# Patient Record
Sex: MALE | Race: WHITE | Employment: OTHER
[De-identification: names, ages, dates, MRNs, and addresses within clinical notes are randomized per-mention and may not be internally consistent; named-entity substitution may affect disease eponyms.]

---

## 2022-06-28 SDOH — HEALTH STABILITY: PHYSICAL HEALTH: ON AVERAGE, HOW MANY MINUTES DO YOU ENGAGE IN EXERCISE AT THIS LEVEL?: 30 MIN

## 2022-06-28 SDOH — HEALTH STABILITY: PHYSICAL HEALTH: ON AVERAGE, HOW MANY DAYS PER WEEK DO YOU ENGAGE IN MODERATE TO STRENUOUS EXERCISE (LIKE A BRISK WALK)?: 4 DAYS

## 2022-06-29 ENCOUNTER — OFFICE VISIT (OUTPATIENT)
Dept: INTERNAL MEDICINE CLINIC | Facility: CLINIC | Age: 72
End: 2022-06-29
Payer: MEDICARE

## 2022-06-29 VITALS
SYSTOLIC BLOOD PRESSURE: 130 MMHG | WEIGHT: 222 LBS | DIASTOLIC BLOOD PRESSURE: 82 MMHG | OXYGEN SATURATION: 97 % | BODY MASS INDEX: 29.42 KG/M2 | HEIGHT: 73 IN | HEART RATE: 56 BPM | TEMPERATURE: 98.4 F

## 2022-06-29 DIAGNOSIS — E78.2 MIXED HYPERLIPIDEMIA: ICD-10-CM

## 2022-06-29 DIAGNOSIS — Z76.89 ESTABLISHING CARE WITH NEW DOCTOR, ENCOUNTER FOR: Primary | ICD-10-CM

## 2022-06-29 DIAGNOSIS — I10 ESSENTIAL (PRIMARY) HYPERTENSION: ICD-10-CM

## 2022-06-29 DIAGNOSIS — E03.9 ACQUIRED HYPOTHYROIDISM: ICD-10-CM

## 2022-06-29 DIAGNOSIS — D69.6 DISORDER INVOLVING THROMBOCYTOPENIA (HCC): ICD-10-CM

## 2022-06-29 PROBLEM — E55.9 VITAMIN D DEFICIENCY: Status: ACTIVE | Noted: 2018-06-04

## 2022-06-29 PROBLEM — J31.0 CHRONIC RHINITIS: Status: ACTIVE | Noted: 2022-06-29

## 2022-06-29 PROBLEM — I65.23 BILATERAL CAROTID ARTERY STENOSIS: Status: ACTIVE | Noted: 2020-06-01

## 2022-06-29 PROBLEM — H91.93 BILATERAL HEARING LOSS: Status: ACTIVE | Noted: 2020-06-01

## 2022-06-29 PROBLEM — J30.9 ALLERGIC RHINITIS: Status: ACTIVE | Noted: 2022-06-29

## 2022-06-29 PROBLEM — D80.9 IMMUNODEFICIENCY WITH PREDOMINANTLY ANTIBODY DEFECTS, UNSPECIFIED (HCC): Status: ACTIVE | Noted: 2021-11-01

## 2022-06-29 PROCEDURE — 1036F TOBACCO NON-USER: CPT | Performed by: INTERNAL MEDICINE

## 2022-06-29 PROCEDURE — 99203 OFFICE O/P NEW LOW 30 MIN: CPT | Performed by: INTERNAL MEDICINE

## 2022-06-29 PROCEDURE — G8427 DOCREV CUR MEDS BY ELIG CLIN: HCPCS | Performed by: INTERNAL MEDICINE

## 2022-06-29 PROCEDURE — 3017F COLORECTAL CA SCREEN DOC REV: CPT | Performed by: INTERNAL MEDICINE

## 2022-06-29 PROCEDURE — G8417 CALC BMI ABV UP PARAM F/U: HCPCS | Performed by: INTERNAL MEDICINE

## 2022-06-29 PROCEDURE — 1123F ACP DISCUSS/DSCN MKR DOCD: CPT | Performed by: INTERNAL MEDICINE

## 2022-06-29 RX ORDER — GABAPENTIN 300 MG/1
300 CAPSULE ORAL
COMMUNITY
Start: 2021-11-22

## 2022-06-29 RX ORDER — AZELASTINE HYDROCHLORIDE 137 UG/1
SPRAY, METERED NASAL
COMMUNITY
Start: 2022-05-20

## 2022-06-29 RX ORDER — DOXYCYCLINE HYCLATE 100 MG/1
CAPSULE ORAL
COMMUNITY
Start: 2022-03-30

## 2022-06-29 RX ORDER — ATORVASTATIN CALCIUM 20 MG/1
20 TABLET, FILM COATED ORAL
COMMUNITY
Start: 2021-11-22

## 2022-06-29 RX ORDER — KETOCONAZOLE 20 MG/ML
SHAMPOO TOPICAL
COMMUNITY
Start: 2022-06-16

## 2022-06-29 RX ORDER — VITAMIN B COMPLEX
300 TABLET ORAL
COMMUNITY
Start: 2020-01-01

## 2022-06-29 RX ORDER — MONTELUKAST SODIUM 10 MG/1
TABLET ORAL
COMMUNITY
Start: 2022-06-21

## 2022-06-29 ASSESSMENT — ENCOUNTER SYMPTOMS
CHEST TIGHTNESS: 0
SHORTNESS OF BREATH: 0

## 2022-06-29 NOTE — PROGRESS NOTES
ASSESSMENT/PLAN:    Care established. Evaluation and management of the chronic condition(s) delineated. No negative side effects reported. I have reviewed all the lab results. There are some abnormalities that are either expected or not critical to the patient's health, and are discussed in the office today and are addressed. Please refer to the above assessement and plan narrative and orders and follow up plan. Medication discussed and refilled as needed. Physical exam findings are stable unless otherwise indicated and this is addressed. The most recent lab work and imaging and consultant/urgent care visits and imaging are reviewed and discussed and considered during this visit encounter. 1. Establishing care with new doctor, encounter for       2. Disorder involving thrombocytopenia (HCC)  Monitor Plts. Stable on recent labs. [place] labs reviewed. Prior eval with Hematology. - Comprehensive Metabolic Panel; Future  - CBC; Future    3. Acquired hypothyroidism  Treatment regimen is effective. - TSH; Future    4. Essential (primary) hypertension  Treatment regimen is effective. Echo normal.      - Comprehensive Metabolic Panel; Future  - CBC; Future  - Lipid Panel; Future  - TSH; Future    5. Mixed hyperlipidemia  The patient is asked to make an attempt to improve diet and exercise patterns to aid in medical management of this problem. A healthy diet to consider is a more mediterranean diet which is abundant in fruits, vegetables, whole grains, legumes and olive oil. It features fish and poultry, lean sources of protein over red meat. The importance of a healthy lifestyle are discussed. Limit high fructose containing foods, moderate portion sizes,  regular cardiovascular exercise (walking, swimming, aerobics) for 30-45 minutes, 3-4 times weekly. - Lipid Panel;  Future                On this date, 6/29/22, I have spent 30 minutes reviewing previous notes, test results and face to face with the patient discussing the diagnosis and importance of compliance with the treatment plan as well as documenting on the day of the visit. An electronic signature was used to authenticate this note. -- Cathi Souza MD     Return in about 6 months (around 2022). SUBJECTIVE/OBJECTIVE:  Chief Complaint   Patient presents with    New Patient     previous PCP Dr Andrzej Coon [facility] in [facility]       HPI:   Mino Robbins (: 1950 is a 70 y.o. male, here for evaluation of the following chief complaint(s):   Chief Complaint   Patient presents with    New Patient     previous PCP Dr Andrzej Coon Internal [facility] in [facility]     The patient. 59-year-old male. Previously followed at [facility] internal medicine by Dr. Jadine Osler. Most recently he followed up with internal medicine May 19 and notes reviewed. Blood pressure fluctuations some leg swelling. It appears an echocardiogram was recommended. Pulmonology about it but they didn't have any suggestions. Doesn't note any changes in routine except maybe going to bed a bit earlier. Finally, asking about ED medication; has been on viagra and can get erection, however cannot reach orgasm and ejaculate. Asking if trying different ED medication might be worth a try. Does acknowledge that he sometimes has psychological barriers as well as medications that can be barrier, but hoping to overcome this       Patient is here for follow-up and management of chronic medical conditions, review of recent labs, review of any imaging completed since our last office visit and discuss any consultants opinions or management changes. Echo 2022- EF 55-65%    Normal left ventricular diastolic function. · The right ventricular systolic function is normal.  · Mild aortic valve calcification without stenosis. · No significant valvular abnormalities.     MINNIE  CPAP Compliance:  2022 - 2022  Usage days greater than 4 hours 100%  Average usage 7 hours 27 minutes  AutoSet 5 to 17 cm H2O  95th percentile pressure 16.9 cm H2O  95th percentile leak 3.5 L/min  AHI 1.6 events per hour    Colonoscopy 2022. Follow up in 3 yr with Dr. Filiberto Canales, personal history of a sessile serrated adenoma on his last colonoscopy of 2/18/2019. In addition he has a history of colon cancer in his mother. Hx of low PLT. No bleeding. ITP? Stable. Was previously evaluated by Dr. Lauren Kong. No f/c/s wt loss. Labs reviewed and discussed and medication refilled as needed for chronic medications during ov or adjusted based on lab results and/or our discussion as appropriate. See discussion. The patient's available records and electronic chart records are reviewed. The PMH, PSH, medications, allergies, medications, FH, health maintenance and vaccination status are all reviewed and updated as appropriate. Records from outside providers have been reviewed, summarized, and considered as noted in the history of present illness, past medical history, and objective data of this note and encounter.           Health Maintenance   Topic Date Due    Annual Wellness Visit (AWV)  Never done    Lipids  Never done    Depression Screen  Never done    DTaP/Tdap/Td vaccine (2 - Td or Tdap) 07/23/2022    Colorectal Cancer Screen  02/21/2032    Flu vaccine  Completed    Pneumococcal 65+ years Vaccine  Completed    COVID-19 Vaccine  Completed    Hepatitis A vaccine  Aged Out    Hepatitis B vaccine  Aged Out    Hib vaccine  Aged Out    Meningococcal (ACWY) vaccine  Aged Out    Hepatitis C screen  Discontinued     Patient Active Problem List   Diagnosis    Disorder involving thrombocytopenia ([facility] [address]    Splenomegaly    Acquired hypothyroidism    Bilateral carotid artery stenosis    Bilateral hearing loss    Essential (primary) hypertension    Immunodeficiency with predominantly antibody defects, unspecified ([address]    Mixed hyperlipidemia    Obstructive sleep apnea    Chronic rhinitis    Neuropathy    Vitamin D deficiency       Review of Systems   Constitutional: Negative for activity change and fatigue. Eyes: Negative for visual disturbance. Respiratory: Negative for chest tightness and shortness of breath. Cardiovascular: Negative for chest pain, palpitations and leg swelling. Endocrine: Negative for polydipsia and polyuria. Genitourinary: Negative for dysuria. Musculoskeletal: Negative for myalgias. Skin: Negative for wound. Neurological: Negative for headaches. Psychiatric/Behavioral: Negative for dysphoric mood. No results found for: WBC, HGB, HCT, MCV, RDW, PLT, NEUTOPHILPCT, LYMPHOPCT, MONOPCT, EOSRELPCT, BASOPCT, LYMPHSABS, MONOSABS, EOSABS, BASOSABS, IMMGRAN, GRANULOCYTEABSOLUTECOUNT    No results found for: NA, K, CL, CO2, ANIONGAP, GLUCOSE, BUN, CREATININE, GFRAA, LABGLOM, CALCIUM, BILITOT, ALT, AST, ALKPHOS, PROT, LABALBU, GLOB, ALBUMIN    No results found for: CHOL, HDL, LDL, TRIG, LDLCALC, VLDL    No results found for: LABA1C    No results found for: TSH    No results found for: PSA    No results found for: SPECGRAV, PHUR, COLORU, CLARITYU, LEUKOCYTESUR, PROTEINU, GLUCOSEU, KETUA, BLOODU, BILIRUBINUR, UROBILINOGEN, NITRU, LABMICR        Vitals:    06/29/22 0844   BP: 130/82   Site: Left Upper Arm   Position: Sitting   Cuff Size: Small Adult   Pulse: 56   Temp: 98.4 °F (36.9 °C)   TempSrc: Temporal   SpO2: 97%   Weight: 222 lb (100.7 kg)   Height: 6' 0.5\" (1.842 m)     Wt Readings from Last 3 Encounters:   06/29/22 222 lb (100.7 kg)     BP Readings from Last 3 Encounters:   06/29/22 130/82     Physical Exam  Vitals and nursing note reviewed. Exam conducted with a chaperone present. Constitutional:       General: He is not in acute distress. Appearance: Normal appearance. HENT:      Head: Normocephalic and atraumatic. Eyes:      Extraocular Movements: Extraocular movements intact. Conjunctiva/sclera: Conjunctivae normal.   Neck:      Vascular: No carotid bruit. Cardiovascular:      Rate and Rhythm: Normal rate and regular rhythm. Heart sounds: No murmur heard. No friction rub. No gallop. Pulmonary:      Effort: Pulmonary effort is normal.      Breath sounds: Normal breath sounds. Musculoskeletal:      Right lower leg: No edema. Left lower leg: No edema. Skin:     Coloration: Skin is not jaundiced or pale. Neurological:      General: No focal deficit present. Mental Status: He is alert. Mental status is at baseline.    Psychiatric:         Mood and Affect: Mood normal.         Behavior: Behavior normal.

## 2022-12-06 ENCOUNTER — PATIENT MESSAGE (OUTPATIENT)
Dept: INTERNAL MEDICINE CLINIC | Facility: CLINIC | Age: 72
End: 2022-12-06

## 2022-12-06 DIAGNOSIS — I10 ESSENTIAL HYPERTENSION: Primary | ICD-10-CM

## 2022-12-06 NOTE — TELEPHONE ENCOUNTER
From: Reagan Robbins  To: Dr. Jennifer Ladd: 12/6/2022 2:20 PM EST  Subject: Refill for Lisinopril    I will run out of lisinopril in the next week. I am not scheduled to see the  until after [place], labs prior to [place]. Can I get a new prescription or at least 30 days worth to get me through my  visit at the end of the month? Pharmacy is [facility] [place] . Phone # [phone].     Faheem Talamantes

## 2022-12-07 RX ORDER — LISINOPRIL 20 MG/1
20 TABLET ORAL DAILY
Qty: 90 TABLET | Refills: 3 | Status: SHIPPED | OUTPATIENT
Start: 2022-12-07

## 2022-12-22 DIAGNOSIS — D69.6 DISORDER INVOLVING THROMBOCYTOPENIA (HCC): ICD-10-CM

## 2022-12-22 DIAGNOSIS — E03.9 ACQUIRED HYPOTHYROIDISM: ICD-10-CM

## 2022-12-22 DIAGNOSIS — E78.2 MIXED HYPERLIPIDEMIA: ICD-10-CM

## 2022-12-22 DIAGNOSIS — I10 ESSENTIAL (PRIMARY) HYPERTENSION: ICD-10-CM

## 2022-12-22 LAB
ALBUMIN SERPL-MCNC: 4.2 G/DL (ref 3.2–4.6)
ALBUMIN/GLOB SERPL: 1.8 (ref 0.4–1.6)
ALP SERPL-CCNC: 98 U/L (ref 50–136)
ALT SERPL-CCNC: 38 U/L (ref 12–65)
ANION GAP SERPL CALC-SCNC: 4 MMOL/L (ref 2–11)
AST SERPL-CCNC: 27 U/L (ref 15–37)
BILIRUB SERPL-MCNC: 0.5 MG/DL (ref 0.2–1.1)
BUN SERPL-MCNC: 20 MG/DL (ref 8–23)
CALCIUM SERPL-MCNC: 8.7 MG/DL (ref 8.3–10.4)
CHLORIDE SERPL-SCNC: 108 MMOL/L (ref 101–110)
CHOLEST SERPL-MCNC: 120 MG/DL
CO2 SERPL-SCNC: 28 MMOL/L (ref 21–32)
CREAT SERPL-MCNC: 1.1 MG/DL (ref 0.8–1.5)
ERYTHROCYTE [DISTWIDTH] IN BLOOD BY AUTOMATED COUNT: 13.1 % (ref 11.9–14.6)
GLOBULIN SER CALC-MCNC: 2.4 G/DL (ref 2.8–4.5)
GLUCOSE SERPL-MCNC: 109 MG/DL (ref 65–100)
HCT VFR BLD AUTO: 43 % (ref 41.1–50.3)
HDLC SERPL-MCNC: 32 MG/DL (ref 40–60)
HDLC SERPL: 3.8
HGB BLD-MCNC: 14.8 G/DL (ref 13.6–17.2)
LDLC SERPL CALC-MCNC: 55.4 MG/DL
MCH RBC QN AUTO: 29.8 PG (ref 26.1–32.9)
MCHC RBC AUTO-ENTMCNC: 34.4 G/DL (ref 31.4–35)
MCV RBC AUTO: 86.5 FL (ref 82–102)
NRBC # BLD: 0 K/UL (ref 0–0.2)
PLATELET # BLD AUTO: 137 K/UL (ref 150–450)
PMV BLD AUTO: 10.2 FL (ref 9.4–12.3)
POTASSIUM SERPL-SCNC: 4.2 MMOL/L (ref 3.5–5.1)
PROT SERPL-MCNC: 6.6 G/DL (ref 6.3–8.2)
RBC # BLD AUTO: 4.97 M/UL (ref 4.23–5.6)
SODIUM SERPL-SCNC: 140 MMOL/L (ref 133–143)
TRIGL SERPL-MCNC: 163 MG/DL (ref 35–150)
TSH, 3RD GENERATION: 1.66 UIU/ML (ref 0.36–3.74)
VLDLC SERPL CALC-MCNC: 32.6 MG/DL (ref 6–23)
WBC # BLD AUTO: 4.9 K/UL (ref 4.3–11.1)

## 2022-12-29 ENCOUNTER — OFFICE VISIT (OUTPATIENT)
Dept: INTERNAL MEDICINE CLINIC | Facility: CLINIC | Age: 72
End: 2022-12-29
Payer: MEDICARE

## 2022-12-29 VITALS
OXYGEN SATURATION: 97 % | HEART RATE: 57 BPM | BODY MASS INDEX: 31.94 KG/M2 | HEIGHT: 73 IN | DIASTOLIC BLOOD PRESSURE: 74 MMHG | WEIGHT: 241 LBS | TEMPERATURE: 98.6 F | SYSTOLIC BLOOD PRESSURE: 126 MMHG

## 2022-12-29 DIAGNOSIS — D69.6 DISORDER INVOLVING THROMBOCYTOPENIA (HCC): ICD-10-CM

## 2022-12-29 DIAGNOSIS — D80.9 IMMUNODEFICIENCY WITH PREDOMINANTLY ANTIBODY DEFECTS, UNSPECIFIED (HCC): ICD-10-CM

## 2022-12-29 DIAGNOSIS — N52.9 ERECTILE DYSFUNCTION, UNSPECIFIED ERECTILE DYSFUNCTION TYPE: ICD-10-CM

## 2022-12-29 DIAGNOSIS — Z23 ENCOUNTER FOR IMMUNIZATION: ICD-10-CM

## 2022-12-29 DIAGNOSIS — E03.9 ACQUIRED HYPOTHYROIDISM: ICD-10-CM

## 2022-12-29 DIAGNOSIS — G62.9 NEUROPATHY: ICD-10-CM

## 2022-12-29 DIAGNOSIS — E78.2 MIXED HYPERLIPIDEMIA: Primary | ICD-10-CM

## 2022-12-29 LAB
CRP SERPL-MCNC: 0.3 MG/DL (ref 0–0.9)
ERYTHROCYTE [SEDIMENTATION RATE] IN BLOOD: <1 MM/HR
FERRITIN SERPL-MCNC: 39 NG/ML (ref 8–388)
VIT B12 SERPL-MCNC: 550 PG/ML (ref 193–986)

## 2022-12-29 PROCEDURE — G8427 DOCREV CUR MEDS BY ELIG CLIN: HCPCS | Performed by: INTERNAL MEDICINE

## 2022-12-29 PROCEDURE — 1036F TOBACCO NON-USER: CPT | Performed by: INTERNAL MEDICINE

## 2022-12-29 PROCEDURE — G8484 FLU IMMUNIZE NO ADMIN: HCPCS | Performed by: INTERNAL MEDICINE

## 2022-12-29 PROCEDURE — G0008 ADMIN INFLUENZA VIRUS VAC: HCPCS | Performed by: INTERNAL MEDICINE

## 2022-12-29 PROCEDURE — 3074F SYST BP LT 130 MM HG: CPT | Performed by: INTERNAL MEDICINE

## 2022-12-29 PROCEDURE — 1123F ACP DISCUSS/DSCN MKR DOCD: CPT | Performed by: INTERNAL MEDICINE

## 2022-12-29 PROCEDURE — 99214 OFFICE O/P EST MOD 30 MIN: CPT | Performed by: INTERNAL MEDICINE

## 2022-12-29 PROCEDURE — G8417 CALC BMI ABV UP PARAM F/U: HCPCS | Performed by: INTERNAL MEDICINE

## 2022-12-29 PROCEDURE — 3017F COLORECTAL CA SCREEN DOC REV: CPT | Performed by: INTERNAL MEDICINE

## 2022-12-29 PROCEDURE — 90694 VACC AIIV4 NO PRSRV 0.5ML IM: CPT | Performed by: INTERNAL MEDICINE

## 2022-12-29 PROCEDURE — 3078F DIAST BP <80 MM HG: CPT | Performed by: INTERNAL MEDICINE

## 2022-12-29 RX ORDER — TADALAFIL 10 MG/1
10 TABLET ORAL PRN
Qty: 30 TABLET | Refills: 3 | Status: SHIPPED | OUTPATIENT
Start: 2022-12-29

## 2022-12-29 RX ORDER — GABAPENTIN 300 MG/1
300 CAPSULE ORAL DAILY
Qty: 90 CAPSULE | Refills: 3 | Status: SHIPPED | OUTPATIENT
Start: 2022-12-29 | End: 2023-03-29

## 2022-12-29 RX ORDER — ATORVASTATIN CALCIUM 20 MG/1
20 TABLET, FILM COATED ORAL
Qty: 90 TABLET | Refills: 3 | Status: SHIPPED | OUTPATIENT
Start: 2022-12-29

## 2022-12-29 RX ORDER — LEVOTHYROXINE SODIUM 0.05 MG/1
50 TABLET ORAL
Qty: 90 TABLET | Refills: 3 | Status: SHIPPED | OUTPATIENT
Start: 2022-12-29

## 2022-12-29 ASSESSMENT — ENCOUNTER SYMPTOMS
CHEST TIGHTNESS: 0
SHORTNESS OF BREATH: 0

## 2022-12-29 NOTE — PROGRESS NOTES
ASSESSMENT/PLAN:    1. Mixed hyperlipidemia  Treatment regimen is effective. The patient is asked to make an attempt to improve diet and exercise patterns to aid in medical management of this problem. - atorvastatin (LIPITOR) 20 MG tablet; Take 1 tablet by mouth Twice a Week  Dispense: 90 tablet; Refill: 3    2. Encounter for immunization     - Influenza, FLUAD, (age 72 y+), IM, Preservative Free, 0.5 mL    3. Acquired hypothyroidism  Treatment regimen is effective. - levothyroxine (SYNTHROID) 50 MCG tablet; Take 1 tablet by mouth every morning (before breakfast)  Dispense: 90 tablet; Refill: 3    4. Neuropathy  NCV and additional labs. See orders. Etiology? Doubt CMT. - gabapentin (NEURONTIN) 300 MG capsule; Take 1 capsule by mouth daily for 90 days. Dispense: 90 capsule; Refill: 3  - Sedimentation Rate; Future  - C-Reactive Protein; Future  - RPR; Future  - MAAME and PE, Serum; Future  - Vitamin B12; Future  - Ferritin; Future  - [facility]    5. Immunodeficiency with predominantly antibody defects, unspecified ([address]     - gabapentin (NEURONTIN) 300 MG capsule; Take 1 capsule by mouth daily for 90 days. Dispense: 90 capsule; Refill: 3  - Sedimentation Rate; Future  - C-Reactive Protein; Future  - RPR; Future  - MAAME and PE, Serum; Future  - Vitamin B12; Future  - Ferritin; Future  - [facility]    6. Disorder involving thrombocytopenia (HCC)  CBC stable. - gabapentin (NEURONTIN) 300 MG capsule; Take 1 capsule by mouth daily for 90 days. Dispense: 90 capsule; Refill: 3  - Sedimentation Rate; Future  - C-Reactive Protein; Future  - RPR; Future  - MAAME and PE, Serum; Future  - Vitamin B12; Future  - Ferritin; Future  - [facility]    7. Erectile dysfunction, unspecified erectile dysfunction type  Cialis prn   - tadalafil (CIALIS) 10 MG tablet;  Take 1 tablet by mouth as needed for Erectile Dysfunction  Dispense: 30 tablet; Refill: 3      Evaluation and management of the chronic condition(s) delineated. No negative side effects reported. I have reviewed all the lab results. There are some abnormalities that are either expected or not critical to the patient's health, and are discussed in the office today and are addressed. Please refer to the above assessement and plan narrative and orders and follow up plan. Medication discussed and refilled as needed. Physical exam findings are stable unless otherwise indicated and this is addressed. The most recent lab work and imaging and consultant/urgent care visits and imaging are reviewed and discussed and considered during this visit encounter. On this date, 22, I have spent 35 minutes reviewing previous notes, test results and face to face with the patient discussing the diagnosis and importance of compliance with the treatment plan as well as documenting on the day of the visit. An electronic signature was used to authenticate this note. -- Td Arnold MD     Return in about 6 months (around 2023). SUBJECTIVE/OBJECTIVE:      HPI:   Celia Wagner (: 1950 is a 67 y.o. male, here for evaluation of the following chief complaint(s):   Chief Complaint   Patient presents with    Hypothyroidism     6 month recheck    Hypertension    Cholesterol Problem    Discuss Labs     The patient. 67 y.o.-old male. Previously followed at [facility] internal medicine by Dr. Chrissy Portillo. Most recently he followed up with internal medicine May 19 and notes reviewed. Patient is here for follow-up and management of chronic medical conditions, review of recent labs, review of any imaging completed since our last office visit and discuss any consultants opinions or management changes. Echo 2022- EF 55-65%    Normal left ventricular diastolic function.   · The right ventricular systolic function is normal.  · Mild aortic valve calcification without stenosis. · No significant valvular abnormalities. MINNIE  CPAP Compliance:  4/12/2022 - 5/11/2022  Usage days greater than 4 hours 100%  Average usage 7 hours 27 minutes  AutoSet 5 to 17 cm H2O  95th percentile pressure 16.9 cm H2O  95th percentile leak 3.5 L/min  AHI 1.6 events per hour    Colonoscopy 2022. Follow up in 3 yr with Dr. Sahra James, personal history of a sessile serrated adenoma on his last colonoscopy of 2/18/2019. In addition he has a history of colon cancer in his mother. Hx of low PLT. No bleeding. ITP? Stable. Was previously evaluated by Dr. Klever Reddy. No f/c/s wt loss. Previously evaluated bone marrow biopsy. Neuropathy  No prior nerve conduction study. He has taken gabapentin for years. Family history of Charcot-Blanca-Tooth and his sister? Neuropathy symptoms mostly at night. No leg weakness to the proximal leg weakness. No fevers chills or sweats. We discussed at length. The etiology of his neuropathy is unknown. Multifactorial?    He has been seeing [facility] for frequent infections and allergy evaluation. Pneumovax in the process of being updated. Outside labs reviewed from [facility].  Discussed. Erectile dysfunction. Previously has taken Cialis and requests new prescription. He has previously taken Viagra which causes him to have some rebound headache and Cialis 10 mg works better for him. Labs reviewed and discussed and medication refilled as needed for chronic medications during ov or adjusted based on lab results and/or our discussion as appropriate. See discussion. The patient's available records and electronic chart records are reviewed. The PMH, PSH, medications, allergies, medications, FH, health maintenance and vaccination status are all reviewed and updated as appropriate.     Records from outside providers have been reviewed, summarized, and considered as noted in the history of present illness, past medical history, and objective data of this note and encounter. Health Maintenance   Topic Date Due    Depression Screen  Never done    Shingles vaccine (1 of 2) 06/04/2015    COVID-19 Vaccine (4 - Booster for Romero Peter series) 09/13/2021    Annual Wellness Visit (AWV)  Never done    DTaP/Tdap/Td vaccine (2 - Td or Tdap) 07/23/2022    Lipids  12/22/2023    Colorectal Cancer Screen  02/21/2032    Flu vaccine  Completed    Pneumococcal 65+ years Vaccine  Completed    Hepatitis A vaccine  Aged Out    Hib vaccine  Aged Out    Meningococcal (ACWY) vaccine  Aged Out    Hepatitis C screen  Discontinued     Patient Active Problem List   Diagnosis    Disorder involving thrombocytopenia ([facility] [address]    Splenomegaly    Acquired hypothyroidism    Bilateral carotid artery stenosis    Bilateral hearing loss    Essential (primary) hypertension    Immunodeficiency with predominantly antibody defects, unspecified ([facility] [address]    Mixed hyperlipidemia    Obstructive sleep apnea    Chronic rhinitis    Neuropathy    Vitamin D deficiency    Erectile dysfunction       Review of Systems   Constitutional:  Negative for activity change, fatigue and unexpected weight change. Eyes:  Negative for visual disturbance. Respiratory:  Negative for chest tightness and shortness of breath. Cardiovascular:  Negative for chest pain, palpitations and leg swelling. Endocrine: Negative for polydipsia and polyuria. Genitourinary:  Negative for dysuria. Musculoskeletal:  Negative for myalgias. Skin:  Negative for wound. Psychiatric/Behavioral:  Negative for dysphoric mood.       Results for orders placed or performed in visit on 12/22/22 (from the past 2160 hour(s))   TSH   Result Value Ref Range    TSH, 3RD GENERATION 1.660 0.358 - 3.740 uIU/mL   Lipid Panel   Result Value Ref Range    Cholesterol, Total 120 <200 MG/DL    Triglycerides 163 (H) 35 - 150 MG/DL    HDL 32 (L) 40 - 60 MG/DL    LDL Calculated 55.4 <100 MG/DL    VLDL Cholesterol Calculated 32.6 (H) 6.0 - 23.0 MG/DL    Chol/HDL Ratio 3.8     CBC   Result Value Ref Range    WBC 4.9 4.3 - 11.1 K/uL    RBC 4.97 4.23 - 5.6 M/uL    Hemoglobin 14.8 13.6 - 17.2 g/dL    Hematocrit 43.0 41.1 - 50.3 %    MCV 86.5 82 - 102 FL    MCH 29.8 26.1 - 32.9 PG    MCHC 34.4 31.4 - 35.0 g/dL    RDW 13.1 11.9 - 14.6 %    Platelets 157 (L) 439 - 450 K/uL    MPV 10.2 9.4 - 12.3 FL    nRBC 0.00 0.0 - 0.2 K/uL   Comprehensive Metabolic Panel   Result Value Ref Range    Sodium 140 133 - 143 mmol/L    Potassium 4.2 3.5 - 5.1 mmol/L    Chloride 108 101 - 110 mmol/L    CO2 28 21 - 32 mmol/L    Anion Gap 4 2 - 11 mmol/L    Glucose 109 (H) 65 - 100 mg/dL    BUN 20 8 - 23 MG/DL    Creatinine 1.10 0.8 - 1.5 MG/DL    Est, Glom Filt Rate >60 >60 ml/min/1.73m2    Calcium 8.7 8.3 - 10.4 MG/DL    Total Bilirubin 0.5 0.2 - 1.1 MG/DL    ALT 38 12 - 65 U/L    AST 27 15 - 37 U/L    Alk Phosphatase 98 50 - 136 U/L    Total Protein 6.6 6.3 - 8.2 g/dL    Albumin 4.2 3.2 - 4.6 g/dL    Globulin 2.4 (L) 2.8 - 4.5 g/dL    Albumin/Globulin Ratio 1.8 (H) 0.4 - 1.6         Lab Results   Component Value Date/Time    WBC 4.9 12/22/2022 08:40 AM    HGB 14.8 12/22/2022 08:40 AM    HCT 43.0 12/22/2022 08:40 AM    MCV 86.5 12/22/2022 08:40 AM    RDW 13.1 12/22/2022 08:40 AM     12/22/2022 08:40 AM       Lab Results   Component Value Date/Time     12/22/2022 08:40 AM    K 4.2 12/22/2022 08:40 AM     12/22/2022 08:40 AM    CO2 28 12/22/2022 08:40 AM    ANIONGAP 4 12/22/2022 08:40 AM    GLUCOSE 109 12/22/2022 08:40 AM    BUN 20 12/22/2022 08:40 AM    CREATININE 1.10 12/22/2022 08:40 AM    LABGLOM >60 12/22/2022 08:40 AM    CALCIUM 8.7 12/22/2022 08:40 AM    BILITOT 0.5 12/22/2022 08:40 AM    ALT 38 12/22/2022 08:40 AM    AST 27 12/22/2022 08:40 AM    ALKPHOS 98 12/22/2022 08:40 AM    PROT 6.6 12/22/2022 08:40 AM    LABALBU 4.2 12/22/2022 08:40 AM    GLOB 2.4 12/22/2022 08:40 AM    ALBUMIN 1.8 12/22/2022 08:40 AM       Lab Results   Component Value Date/Time CHOL 120 12/22/2022 08:40 AM    HDL 32 12/22/2022 08:40 AM    TRIG 163 12/22/2022 08:40 AM    LDLCALC 55.4 12/22/2022 08:40 AM       No results found for: LABA1C    No results found for: TSH    No results found for: PSA    No results found for: [place], COLORU, CLARITYU, LEUKOCYTESUR, PROTEINU, GLUCOSEU, KETUA, BLOODU, BILIRUBINUR, UROBILINOGEN, NITRU, LABMICR        Vitals:    12/29/22 1022   BP: 126/74   Site: Left Upper Arm   Position: Sitting   Cuff Size: Small Adult   Pulse: 57   Temp: 98.6 °F (37 °C)   TempSrc: Skin   SpO2: 97%   Weight: 241 lb (109.3 kg)   Height: 6' 0.5\" (1.842 m)     Wt Readings from Last 3 Encounters:   12/29/22 241 lb (109.3 kg)   06/29/22 222 lb (100.7 kg)     BP Readings from Last 3 Encounters:   12/29/22 126/74   06/29/22 130/82     Physical Exam  Vitals and nursing note reviewed. Exam conducted with a chaperone present. Constitutional:       General: He is not in acute distress. Appearance: Normal appearance. HENT:      Head: Normocephalic and atraumatic. Eyes:      Extraocular Movements: Extraocular movements intact. Conjunctiva/sclera: Conjunctivae normal.   Neck:      Vascular: No carotid bruit. Cardiovascular:      Rate and Rhythm: Normal rate and regular rhythm. Heart sounds: No murmur heard. No friction rub. No gallop. Pulmonary:      Effort: Pulmonary effort is normal.      Breath sounds: Normal breath sounds. Musculoskeletal:      Right lower leg: No edema. Left lower leg: No edema. Skin:     Coloration: Skin is not jaundiced or pale. Neurological:      General: No focal deficit present. Mental Status: He is alert. Mental status is at baseline.    Psychiatric:         Mood and Affect: Mood normal.         Behavior: Behavior normal.

## 2022-12-30 LAB — RPR SER QL: NONREACTIVE

## 2023-01-06 LAB
ALBUMIN SERPL ELPH-MCNC: 4 G/DL (ref 2.9–4.4)
ALBUMIN/GLOB SERPL: 1.7 (ref 0.7–1.7)
ALPHA1 GLOB SERPL ELPH-MCNC: 0.2 G/DL (ref 0–0.4)
ALPHA2 GLOB SERPL ELPH-MCNC: 0.5 G/DL (ref 0.4–1)
B-GLOBULIN SERPL ELPH-MCNC: 0.8 G/DL (ref 0.7–1.3)
GAMMA GLOB SERPL ELPH-MCNC: 0.8 G/DL (ref 0.4–1.8)
GLOBULIN SER-MCNC: 2.4 G/DL (ref 2.2–3.9)
IGA SERPL-MCNC: 81 MG/DL (ref 61–437)
IGG SERPL-MCNC: 785 MG/DL (ref 603–1613)
IGM SERPL-MCNC: 73 MG/DL (ref 15–143)
INTERPRETATION SERPL IEP-IMP: NORMAL
M PROTEIN SERPL ELPH-MCNC: NORMAL G/DL
PROT SERPL-MCNC: 6.4 G/DL (ref 6–8.5)

## 2023-02-06 ENCOUNTER — PROCEDURE VISIT (OUTPATIENT)
Dept: NEUROLOGY | Age: 73
End: 2023-02-06
Payer: MEDICARE

## 2023-02-06 VITALS
DIASTOLIC BLOOD PRESSURE: 87 MMHG | BODY MASS INDEX: 31.7 KG/M2 | WEIGHT: 237 LBS | HEART RATE: 59 BPM | SYSTOLIC BLOOD PRESSURE: 161 MMHG

## 2023-02-06 DIAGNOSIS — G60.0 CMT (CHARCOT-MARIE-TOOTH DISEASE): Primary | ICD-10-CM

## 2023-02-06 DIAGNOSIS — R29.898 WEAKNESS OF DISTAL ARMS AND LEGS: ICD-10-CM

## 2023-02-06 DIAGNOSIS — R20.0 NUMBNESS AND TINGLING OF BOTH FEET: ICD-10-CM

## 2023-02-06 DIAGNOSIS — R20.2 NUMBNESS AND TINGLING OF BOTH FEET: ICD-10-CM

## 2023-02-06 DIAGNOSIS — R20.0 NUMBNESS AND TINGLING IN BOTH HANDS: ICD-10-CM

## 2023-02-06 DIAGNOSIS — R20.2 NUMBNESS AND TINGLING IN BOTH HANDS: ICD-10-CM

## 2023-02-06 PROCEDURE — 95885 MUSC TST DONE W/NERV TST LIM: CPT | Performed by: PSYCHIATRY & NEUROLOGY

## 2023-02-06 PROCEDURE — 95913 NRV CNDJ TEST 13/> STUDIES: CPT | Performed by: PSYCHIATRY & NEUROLOGY

## 2023-02-06 ASSESSMENT — ENCOUNTER SYMPTOMS: BACK PAIN: 0

## 2023-02-06 ASSESSMENT — VISUAL ACUITY: VA_NORMAL: 1

## 2023-02-06 NOTE — PROGRESS NOTES
2/6/2023  Wilber Robbins 67 y.o. male      Seen at the request of [unfilled]    Chief Complaint:  Chief Complaint   Patient presents with    Peripheral Neuropathy          HPI:   Foot pain on the left then developed hammer toes around in 1996, right foot pain then hammer toes in 2010, buzzing tingling in feet around 2014. When in teenage still able to run. Never been good at jogging. Walking 30 min 1.5 miles, 3 miles/ hour, 4-5 days per week. Imbalance. Squaring too low would need upper arms support. Occasional falls, stumbling, tripping by left. Takes gabapentin to relieve paresthesia. FH mother and his 2 sisters + CMT, sisters on ankle braces. IMAGING REVIEW: I have reviewed imaging study- non relevant, and labs- neuropathy panel neg.      Past Medical History:    Acquired hypothyroidism    Bilateral carotid artery stenosis    Bilateral hearing loss    Essential (primary) hypertension    Immunodeficiency with predominantly antibody defects, unspecified (HCC)    Mixed hyperlipidemia    Obstructive sleep apnea    Chronic rhinitis    Vitamin D deficiency    Erectile dysfunction    Disorder involving thrombocytopenia (HCC)    Splenomegaly        Past Surgical History:  Past Surgical History:   Procedure Laterality Date    BLEPHAROPLASTY      right    SINUS SURGERY  1998    TONSILLECTOMY  1970       Social History:  Social History     Socioeconomic History    Marital status:      Spouse name: Not on file    Number of children: Not on file    Years of education: Not on file    Highest education level: Not on file   Occupational History    Not on file   Tobacco Use    Smoking status: Never    Smokeless tobacco: Never   Vaping Use    Vaping Use: Never used   Substance and Sexual Activity    Alcohol use: Never    Drug use: Never    Sexual activity: Not on file   Other Topics Concern    Not on file   Social History Narrative    Not on file     Social Determinants of Health     Financial Resource Strain: Not on file   Food Insecurity: Not on file   Transportation Needs: Not on file   Physical Activity: Insufficiently Active    Days of Exercise per Week: 4 days    Minutes of Exercise per Session: 30 min   Stress: Not on file   Social Connections: Not on file   Intimate Partner Violence: Not At Risk    Fear of Current or Ex-Partner: No    Emotionally Abused: No    Physically Abused: No    Sexually Abused: No   Housing Stability: Not on file       Family History:   Family History   Problem Relation Age of Onset    Breast Cancer Mother     Cancer Mother         colon    Stroke Father 79    Heart Attack Father    CMT      Current Outpatient Medications   Medication Sig Dispense Refill    atorvastatin (LIPITOR) 20 MG tablet Take 1 tablet by mouth Twice a Week 90 tablet 3    levothyroxine (SYNTHROID) 50 MCG tablet Take 1 tablet by mouth every morning (before breakfast) 90 tablet 3    gabapentin (NEURONTIN) 300 MG capsule Take 1 capsule by mouth daily for 90 days. 90 capsule 3    tadalafil (CIALIS) 10 MG tablet Take 1 tablet by mouth as needed for Erectile Dysfunction 30 tablet 3    lisinopril (PRINIVIL;ZESTRIL) 20 MG tablet Take 1 tablet by mouth daily 90 tablet 3    Azelastine HCl 137 MCG/SPRAY SOLN       doxycycline hyclate (VIBRAMYCIN) 100 MG capsule Take 100 mg by mouth daily      ketoconazole (NIZORAL) 2 % shampoo       montelukast (SINGULAIR) 10 MG tablet       Coenzyme Q10 (COQ10) 100 MG CAPS 300 mg       Cholecalciferol 50 MCG (2000 UT) CAPS Take 2 capsules by mouth daily      Multiple Vitamins-Minerals (CENTRUM SILVER 50+MEN PO) Take by mouth      zinc 50 MG CAPS Take 50 mg by mouth daily      mupirocin (BACTROBAN) 2 % nasal ointment by Nasal route 2 times daily Take by Nasal route 2 times daily. No current facility-administered medications for this visit.         Allergies   Allergen Reactions    Latex      Other reaction(s): Rash-Allergy    Erythromycin Diarrhea    Amoxicillin-Pot Clavulanate      Other reaction(s): Unknown-Unspecified    Tetracycline Rash     Other reaction(s): Rash-Allergy       Review of Systems:  Review of Systems   Musculoskeletal:  Positive for falls. Negative for back pain and joint pain. Neurological:  Positive for tingling, sensory change and focal weakness. No flowsheet data found. No flowsheet data found. Extended / Orthostatic Vitals:      Vitals:    02/06/23 1056   BP: (!) 161/87   Site: Left Upper Arm   Pulse: 59   Weight: 237 lb (107.5 kg)        Physical Exam  Vitals reviewed. Constitutional:       Appearance: He is normal weight. HENT:      Head: Normocephalic. Eyes:      Extraocular Movements: Extraocular movements intact and EOM normal.      Conjunctiva/sclera: Conjunctivae normal.      Pupils: Pupils are equal, round, and reactive to light. Cardiovascular:      Rate and Rhythm: Normal rate. Pulmonary:      Effort: Pulmonary effort is normal.   Musculoskeletal:         General: Normal range of motion. Cervical back: Normal range of motion. Skin:     General: Skin is warm and dry. Neurological:      Mental Status: He is alert and oriented to person, place, and time. Cranial Nerves: No cranial nerve deficit. Sensory: Sensory deficit present. Motor: Weakness present. Coordination: Coordination normal. Romberg Test normal.      Gait: Gait abnormal.      Deep Tendon Reflexes: Reflexes abnormal.      Reflex Scores:       Tricep reflexes are 0 on the right side and 0 on the left side. Bicep reflexes are 1+ on the right side and 1+ on the left side. Brachioradialis reflexes are 0 on the right side and 0 on the left side. Patellar reflexes are 2+ on the right side and 1+ on the left side. Achilles reflexes are 0 on the right side and 0 on the left side. Psychiatric:         Mood and Affect: Mood normal.         Speech: Speech normal.         Behavior: Behavior normal.         Thought Content:  Thought content normal.         Judgment: Judgment normal.        Neurologic Exam     Mental Status   Oriented to person, place, and time. Concentration: normal.   Speech: speech is normal   Level of consciousness: alert  Knowledge: good. Normal comprehension. Provided clear history, memory capacity was normal, no dysphasia. Cranial Nerves     CN II   Visual fields full to confrontation. Visual acuity: normal  Right visual field deficit: none  Left visual field deficit: none     CN III, IV, VI   Pupils are equal, round, and reactive to light. Extraocular motions are normal.   Right pupil: Size: 3 mm. Shape: regular. Left pupil: Size: 3 mm. Shape: regular. Nystagmus: none   Diplopia: none  Ophthalmoparesis: none  Upgaze: normal  Downgaze: normal    CN V   Facial sensation intact. CN VII   Facial expression full, symmetric. CN VIII   CN VIII normal.     CN IX, X   CN IX normal.   CN X normal.     CN XI   CN XI normal.     CN XII   CN XII normal.     Motor Exam   Muscle bulk: normal  Overall muscle tone: normal  Right arm pronator drift: absent  Left arm pronator drift: absent    Strength   Strength 5/5 except as noted. Right anterior tibial: 4/5  Left anterior tibial: 3/5  Right posterior tibial: 5/5  Left posterior tibial: 4/5  Right peroneal: 3/5  Left peroneal: 2/5  Right gastroc: 5/5  Left gastroc: 5/5  Finger tapping normal. Bilateral upper 5/5, no atrophy; bilateral proximal 5/5, distal 3-4/5 worse on left, hammer toes.       Sensory Exam   Light touch normal.   Right arm vibration: normal  Left arm vibration: normal  Right leg vibration: decreased from toes  Left leg vibration: decreased from toes    Gait, Coordination, and Reflexes     Gait  Gait: wide-based    Coordination   Romberg: negative    Tremor   Resting tremor: absent  Intention tremor: absent  Action tremor: absent    Reflexes   Right brachioradialis: 0  Left brachioradialis: 0  Right biceps: 1+  Left biceps: 1+  Right triceps: 0  Left triceps: 0  Right patellar: 2+  Left patellar: 1+  Right achilles: 0  Left achilles: 0  Mildly wide based. Assessment / Plan:    Dahlia Fajardo was seen today for peripheral neuropathy. 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 Family history strongly supports the diagnosis. Today's examination showed bilateral lower limb partial weakness of distal muscles, left greater than right. Patient can be evaluated at [facility] if he and his children consider CMT genetic testing. CMT chapter can be found by [employer] search. Continue healthy lifestyle, and regular physical exercise including muscle toning and walking, avoid strenuous activities and climbing ladders. Fall precautions were addressed. He will benefit from intermittent physical therapy for weak muscle strengthening, balance and gait training. Continue gabapentin as symptomatic treatment. Patient may return yearly or sooner when problem arises. I have spent 45 min, greater than 50% of discussing and counseling with patient, for treatment and diagnostic plan review.

## 2023-02-06 NOTE — PROGRESS NOTES
[address], [address]93 [facility], [address] [place] [address]       Nerve Conduction Study and Electromyogram Report           Hx: 67 y.o. male with complaint of numbness and mild weakness of feet, worse on L. Symptoms have been present for 20 years, onset at his 46s yo. Significant past medical history includes + FH CMT neuropathy. Clinical summary was reviewed. Neurological examination: Motor power showed distal weakness in the both lower extremities. There was hammer toes, otherwise no obvious atrophy. There were no fasciculations. Deep tendon reflexes were hypoactive. Distal sensory deficit noted. Gait stable, Romberg sign negative. Description: Nerve conduction studies were performed on the left upper extremity, right lower extremity, and left lower extremity, using standard technique. Left median ulnar and radial sensory responses were absent. Bilateral sural response was absent. Left transcarpal study showed no responses. The median motor distal latency remarkably prolonged 12.29 MS, amplitude significantly reduced to 0.5/0.7 mV, CV significantly reduced 30. Ulnar motor distal latency prolonged 5.19 MS, amplitude reduced 3.8/2.1/2.6 mV, CV significantly reduced 23/26. Peroneal and tibial motor nerves showed no responses. Bilateral peroneal segmental study recorded from tibialis anterior showed normal responses. F-wave latencies of lower limbs were absent, left median and ulnar significantly prolonged 55.8 and 53.9 MS respectively. H reflex response was absent bilaterally. Needle electromyography was performed on the left upper extremity and left lower extremity, using standard concentric electrodes. Low degree of denervation changes with partially reduced recruitment were recorded from the left tibialis anterior and gastrocnemius, mild renervation changes were noted from tibialis anterior.   First dorsal interosseous showed reduced recruitment. Other muscles rectus femoris, flexor carpi radialis and biceps were normal.        Conclusion: This study showed neurophysiologic evidence of chronic sensorimotor polyneuropathy with feature of dominant, diffuse demyelination associated with low degree of axonal loss, severe in degree. Findings strongly suggested Charcot-Blanca-Tooth type I.         Procedure Details: Under procedure category          Giacomo Brewster MD

## 2023-06-22 DIAGNOSIS — E78.2 MIXED HYPERLIPIDEMIA: Primary | ICD-10-CM

## 2023-06-22 DIAGNOSIS — E55.9 VITAMIN D DEFICIENCY: ICD-10-CM

## 2023-06-22 DIAGNOSIS — E03.9 ACQUIRED HYPOTHYROIDISM: ICD-10-CM

## 2023-06-22 DIAGNOSIS — I10 ESSENTIAL HYPERTENSION: ICD-10-CM

## 2023-06-26 ENCOUNTER — HOSPITAL ENCOUNTER (OUTPATIENT)
Dept: LAB | Age: 73
Discharge: HOME | End: 2023-06-29

## 2023-06-26 DIAGNOSIS — I10 ESSENTIAL HYPERTENSION: ICD-10-CM

## 2023-06-26 DIAGNOSIS — E78.2 MIXED HYPERLIPIDEMIA: ICD-10-CM

## 2023-06-26 DIAGNOSIS — E03.9 ACQUIRED HYPOTHYROIDISM: ICD-10-CM

## 2023-06-26 DIAGNOSIS — E55.9 VITAMIN D DEFICIENCY: ICD-10-CM

## 2023-06-26 LAB
ALBUMIN SERPL-MCNC: 4.2 G/DL (ref 3.2–4.6)
ALBUMIN/GLOB SERPL: 1.9 (ref 0.4–1.6)
ALP SERPL-CCNC: 101 U/L (ref 50–136)
ALT SERPL-CCNC: 31 U/L (ref 12–65)
ANION GAP SERPL CALC-SCNC: 3 MMOL/L (ref 2–11)
AST SERPL-CCNC: 21 U/L (ref 15–37)
BASOPHILS # BLD: 0 K/UL (ref 0–0.2)
BASOPHILS NFR BLD: 1 % (ref 0–2)
BILIRUB SERPL-MCNC: 0.5 MG/DL (ref 0.2–1.1)
BUN SERPL-MCNC: 15 MG/DL (ref 8–23)
CALCIUM SERPL-MCNC: 9 MG/DL (ref 8.3–10.4)
CHLORIDE SERPL-SCNC: 110 MMOL/L (ref 101–110)
CO2 SERPL-SCNC: 30 MMOL/L (ref 21–32)
CREAT SERPL-MCNC: 1.2 MG/DL (ref 0.8–1.5)
DIFFERENTIAL METHOD BLD: ABNORMAL
EOSINOPHIL # BLD: 0 K/UL (ref 0–0.8)
EOSINOPHIL NFR BLD: 1 % (ref 0.5–7.8)
ERYTHROCYTE [DISTWIDTH] IN BLOOD BY AUTOMATED COUNT: 13.4 % (ref 11.9–14.6)
GLOBULIN SER CALC-MCNC: 2.2 G/DL (ref 2.8–4.5)
GLUCOSE SERPL-MCNC: 104 MG/DL (ref 65–100)
HCT VFR BLD AUTO: 44.4 % (ref 41.1–50.3)
HGB BLD-MCNC: 15.2 G/DL (ref 13.6–17.2)
IMM GRANULOCYTES # BLD AUTO: 0 K/UL (ref 0–0.5)
IMM GRANULOCYTES NFR BLD AUTO: 1 % (ref 0–5)
LYMPHOCYTES # BLD: 1.2 K/UL (ref 0.5–4.6)
LYMPHOCYTES NFR BLD: 24 % (ref 13–44)
MCH RBC QN AUTO: 30.3 PG (ref 26.1–32.9)
MCHC RBC AUTO-ENTMCNC: 34.2 G/DL (ref 31.4–35)
MCV RBC AUTO: 88.6 FL (ref 82–102)
MONOCYTES # BLD: 0.5 K/UL (ref 0.1–1.3)
MONOCYTES NFR BLD: 9 % (ref 4–12)
NEUTS SEG # BLD: 3.3 K/UL (ref 1.7–8.2)
NEUTS SEG NFR BLD: 64 % (ref 43–78)
NRBC # BLD: 0 K/UL (ref 0–0.2)
PLATELET # BLD AUTO: 136 K/UL (ref 150–450)
PMV BLD AUTO: 10.4 FL (ref 9.4–12.3)
POTASSIUM SERPL-SCNC: 4.5 MMOL/L (ref 3.5–5.1)
PROT SERPL-MCNC: 6.4 G/DL (ref 6.3–8.2)
RBC # BLD AUTO: 5.01 M/UL (ref 4.23–5.6)
SODIUM SERPL-SCNC: 143 MMOL/L (ref 133–143)
WBC # BLD AUTO: 5.1 K/UL (ref 4.3–11.1)

## 2023-06-28 LAB
25(OH)D3 SERPL-MCNC: 36.2 NG/ML (ref 30–100)
CHOLEST SERPL-MCNC: 121 MG/DL
HDLC SERPL-MCNC: 33 MG/DL (ref 40–60)
HDLC SERPL: 3.7
LDLC SERPL CALC-MCNC: 47 MG/DL
TRIGL SERPL-MCNC: 205 MG/DL (ref 35–150)
TSH, 3RD GENERATION: 1.18 UIU/ML (ref 0.36–3.74)
VLDLC SERPL CALC-MCNC: 41 MG/DL (ref 6–23)

## 2023-06-30 SDOH — ECONOMIC STABILITY: HOUSING INSECURITY
IN THE LAST 12 MONTHS, WAS THERE A TIME WHEN YOU DID NOT HAVE A STEADY PLACE TO SLEEP OR SLEPT IN A SHELTER (INCLUDING NOW)?: NO

## 2023-06-30 SDOH — ECONOMIC STABILITY: FOOD INSECURITY: WITHIN THE PAST 12 MONTHS, THE FOOD YOU BOUGHT JUST DIDN'T LAST AND YOU DIDN'T HAVE MONEY TO GET MORE.: NEVER TRUE

## 2023-06-30 SDOH — HEALTH STABILITY: PHYSICAL HEALTH: ON AVERAGE, HOW MANY DAYS PER WEEK DO YOU ENGAGE IN MODERATE TO STRENUOUS EXERCISE (LIKE A BRISK WALK)?: 4 DAYS

## 2023-06-30 SDOH — HEALTH STABILITY: PHYSICAL HEALTH: ON AVERAGE, HOW MANY MINUTES DO YOU ENGAGE IN EXERCISE AT THIS LEVEL?: 40 MIN

## 2023-06-30 SDOH — ECONOMIC STABILITY: INCOME INSECURITY: HOW HARD IS IT FOR YOU TO PAY FOR THE VERY BASICS LIKE FOOD, HOUSING, MEDICAL CARE, AND HEATING?: NOT HARD AT ALL

## 2023-06-30 SDOH — ECONOMIC STABILITY: TRANSPORTATION INSECURITY
IN THE PAST 12 MONTHS, HAS LACK OF TRANSPORTATION KEPT YOU FROM MEETINGS, WORK, OR FROM GETTING THINGS NEEDED FOR DAILY LIVING?: NO

## 2023-06-30 SDOH — ECONOMIC STABILITY: FOOD INSECURITY: WITHIN THE PAST 12 MONTHS, YOU WORRIED THAT YOUR FOOD WOULD RUN OUT BEFORE YOU GOT MONEY TO BUY MORE.: NEVER TRUE

## 2023-06-30 ASSESSMENT — PATIENT HEALTH QUESTIONNAIRE - PHQ9
SUM OF ALL RESPONSES TO PHQ QUESTIONS 1-9: 0
SUM OF ALL RESPONSES TO PHQ QUESTIONS 1-9: 0
1. LITTLE INTEREST OR PLEASURE IN DOING THINGS: 0
SUM OF ALL RESPONSES TO PHQ QUESTIONS 1-9: 0
SUM OF ALL RESPONSES TO PHQ9 QUESTIONS 1 & 2: 0
2. FEELING DOWN, DEPRESSED OR HOPELESS: 0
SUM OF ALL RESPONSES TO PHQ QUESTIONS 1-9: 0

## 2023-06-30 ASSESSMENT — LIFESTYLE VARIABLES
HOW MANY STANDARD DRINKS CONTAINING ALCOHOL DO YOU HAVE ON A TYPICAL DAY: 0
HOW MANY STANDARD DRINKS CONTAINING ALCOHOL DO YOU HAVE ON A TYPICAL DAY: PATIENT DOES NOT DRINK
HOW OFTEN DO YOU HAVE A DRINK CONTAINING ALCOHOL: NEVER
HOW OFTEN DO YOU HAVE SIX OR MORE DRINKS ON ONE OCCASION: 1
HOW OFTEN DO YOU HAVE A DRINK CONTAINING ALCOHOL: 1

## 2023-07-03 ENCOUNTER — OFFICE VISIT (OUTPATIENT)
Dept: INTERNAL MEDICINE CLINIC | Facility: CLINIC | Age: 73
End: 2023-07-03
Payer: MEDICARE

## 2023-07-03 VITALS
TEMPERATURE: 97.9 F | SYSTOLIC BLOOD PRESSURE: 132 MMHG | HEIGHT: 73 IN | DIASTOLIC BLOOD PRESSURE: 84 MMHG | OXYGEN SATURATION: 96 % | BODY MASS INDEX: 29.82 KG/M2 | HEART RATE: 74 BPM | WEIGHT: 225 LBS

## 2023-07-03 DIAGNOSIS — R94.130 ABNORMAL NCS (NERVE CONDUCTION STUDIES): ICD-10-CM

## 2023-07-03 DIAGNOSIS — Z12.5 ENCOUNTER FOR PROSTATE CANCER SCREENING: ICD-10-CM

## 2023-07-03 DIAGNOSIS — D69.6 DISORDER INVOLVING THROMBOCYTOPENIA (HCC): ICD-10-CM

## 2023-07-03 DIAGNOSIS — D80.9 IMMUNODEFICIENCY WITH PREDOMINANTLY ANTIBODY DEFECTS, UNSPECIFIED (HCC): ICD-10-CM

## 2023-07-03 DIAGNOSIS — G62.9 NEUROPATHY: ICD-10-CM

## 2023-07-03 DIAGNOSIS — Z00.00 MEDICARE ANNUAL WELLNESS VISIT, SUBSEQUENT: Primary | ICD-10-CM

## 2023-07-03 LAB — PSA SERPL-MCNC: 0.9 NG/ML

## 2023-07-03 PROCEDURE — G8417 CALC BMI ABV UP PARAM F/U: HCPCS | Performed by: INTERNAL MEDICINE

## 2023-07-03 PROCEDURE — G8427 DOCREV CUR MEDS BY ELIG CLIN: HCPCS | Performed by: INTERNAL MEDICINE

## 2023-07-03 PROCEDURE — 1123F ACP DISCUSS/DSCN MKR DOCD: CPT | Performed by: INTERNAL MEDICINE

## 2023-07-03 PROCEDURE — 1036F TOBACCO NON-USER: CPT | Performed by: INTERNAL MEDICINE

## 2023-07-03 PROCEDURE — 3017F COLORECTAL CA SCREEN DOC REV: CPT | Performed by: INTERNAL MEDICINE

## 2023-07-03 PROCEDURE — 3075F SYST BP GE 130 - 139MM HG: CPT | Performed by: INTERNAL MEDICINE

## 2023-07-03 PROCEDURE — 3079F DIAST BP 80-89 MM HG: CPT | Performed by: INTERNAL MEDICINE

## 2023-07-03 PROCEDURE — 99214 OFFICE O/P EST MOD 30 MIN: CPT | Performed by: INTERNAL MEDICINE

## 2023-07-03 PROCEDURE — G0439 PPPS, SUBSEQ VISIT: HCPCS | Performed by: INTERNAL MEDICINE

## 2023-07-03 RX ORDER — GABAPENTIN 300 MG/1
300 CAPSULE ORAL DAILY
Qty: 90 CAPSULE | Refills: 3 | Status: CANCELLED | OUTPATIENT
Start: 2023-07-03

## 2023-07-03 NOTE — PATIENT INSTRUCTIONS
COLONOSCOPY 2005, 2013, 2/18/19, and 2/2022. Next colonoscopy due with Dr. Vicki Box 2/21/2025 due to prior adenomatous polyp history. PSA Screen Counseling: PSA Screen Counseling: Discussed the potential benefits and harms of the prostate-specific antigen (PSA) serum level test as a screening tool for early prostate cancer detection. USPTF general guidelines   recommend starting at age 54,   PSA screening could be considered between the ages of 36 and 47 if you: Have at least one first-degree relative (such as your father or brother) who has had prostate cancer. American Cancer Society general recommendations for prostate cancer screening  Age 48 for men who are at average risk of prostate cancer and are expected to live at least 10 more years. Age 39 for men at high risk of developing prostate cancer. This includes  Americans and men who have a first-degree relative (father or brother) diagnosed with prostate cancer at an early age (younger than age 72). Age 36 for men at even higher risk (those with more than one first-degree relative who had prostate cancer at an early age). The natural history of prostate cancer and ongoing controversy regarding screening and potential treatment outcomes of prostate cancer has been discussed with the patient. The meaning of a false positive PSA and a false negative PSA has been discussed. PSA testing is generally not recommend after age 79 or if you develop other serious medical conditions that limit your life expectancy. The patient has been counseled on these guidelines, issues and controversy with regards to PSA testing. He indicates understanding of the limitations of this screening test and wishes to proceed with screening PSA testing. Starting a Weight Loss Plan: Care Instructions  Overview     If you're thinking about losing weight, it can be hard to know where to start.  Your doctor can help you set up a weight loss plan that

## 2023-08-03 ENCOUNTER — TELEPHONE (OUTPATIENT)
Dept: NEUROLOGY | Age: 73
End: 2023-08-03

## 2023-08-03 NOTE — TELEPHONE ENCOUNTER
Patient left a VM stating he was trying to schedule an appointment with Neurology to see Dr. Ashleigh Eddy. After reading notes in his chart, patient should be scheduled with Dr. Veronica Beal. Tried to contact patient to schedule. Had to leave a VM for him to return call.

## 2023-09-28 ENCOUNTER — OFFICE VISIT (OUTPATIENT)
Dept: NEUROLOGY | Age: 73
End: 2023-09-28
Payer: MEDICARE

## 2023-09-28 VITALS
SYSTOLIC BLOOD PRESSURE: 125 MMHG | OXYGEN SATURATION: 92 % | HEART RATE: 65 BPM | WEIGHT: 230.6 LBS | HEIGHT: 73 IN | BODY MASS INDEX: 30.56 KG/M2 | DIASTOLIC BLOOD PRESSURE: 71 MMHG

## 2023-09-28 DIAGNOSIS — I10 ESSENTIAL (PRIMARY) HYPERTENSION: ICD-10-CM

## 2023-09-28 DIAGNOSIS — G60.0 CMT (CHARCOT-MARIE-TOOTH DISEASE): Primary | ICD-10-CM

## 2023-09-28 DIAGNOSIS — G62.9 NEUROPATHY: ICD-10-CM

## 2023-09-28 DIAGNOSIS — Z71.9 HEALTH EDUCATION/COUNSELING: ICD-10-CM

## 2023-09-28 PROBLEM — G47.09 OTHER INSOMNIA: Status: ACTIVE | Noted: 2023-05-17

## 2023-09-28 PROCEDURE — 1036F TOBACCO NON-USER: CPT | Performed by: PSYCHIATRY & NEUROLOGY

## 2023-09-28 PROCEDURE — 1123F ACP DISCUSS/DSCN MKR DOCD: CPT | Performed by: PSYCHIATRY & NEUROLOGY

## 2023-09-28 PROCEDURE — 99205 OFFICE O/P NEW HI 60 MIN: CPT | Performed by: PSYCHIATRY & NEUROLOGY

## 2023-09-28 PROCEDURE — G8427 DOCREV CUR MEDS BY ELIG CLIN: HCPCS | Performed by: PSYCHIATRY & NEUROLOGY

## 2023-09-28 PROCEDURE — G8417 CALC BMI ABV UP PARAM F/U: HCPCS | Performed by: PSYCHIATRY & NEUROLOGY

## 2023-09-28 PROCEDURE — 3017F COLORECTAL CA SCREEN DOC REV: CPT | Performed by: PSYCHIATRY & NEUROLOGY

## 2023-09-28 PROCEDURE — 3074F SYST BP LT 130 MM HG: CPT | Performed by: PSYCHIATRY & NEUROLOGY

## 2023-09-28 PROCEDURE — 3078F DIAST BP <80 MM HG: CPT | Performed by: PSYCHIATRY & NEUROLOGY

## 2023-09-28 ASSESSMENT — PATIENT HEALTH QUESTIONNAIRE - PHQ9
1. LITTLE INTEREST OR PLEASURE IN DOING THINGS: 0
1. LITTLE INTEREST OR PLEASURE IN DOING THINGS: 0
2. FEELING DOWN, DEPRESSED OR HOPELESS: 0
SUM OF ALL RESPONSES TO PHQ QUESTIONS 1-9: 0
SUM OF ALL RESPONSES TO PHQ9 QUESTIONS 1 & 2: 0
SUM OF ALL RESPONSES TO PHQ9 QUESTIONS 1 & 2: 0
SUM OF ALL RESPONSES TO PHQ QUESTIONS 1-9: 0
2. FEELING DOWN, DEPRESSED OR HOPELESS: 0

## 2023-09-28 ASSESSMENT — ENCOUNTER SYMPTOMS
BACK PAIN: 0
DIARRHEA: 0
COLOR CHANGE: 0
NAUSEA: 0
ABDOMINAL PAIN: 0
EYE PAIN: 0
SHORTNESS OF BREATH: 0
COUGH: 0
CONSTIPATION: 0
SORE THROAT: 0

## 2023-09-28 NOTE — PROGRESS NOTES
[facility] NEUROLOGY NOTE    Patient: Dominic Kirkpatrick  Physician: Clarissa Mancini MD    CC: Patient is here to follow-up for CMT/neuropathy results  Chief Complaint   Patient presents with    New Patient     New patient for Neuropathy and abnormal NS     PCP: Alfredo Escoto MD    History of Present Illness:     Dominic Kirkpatrick is a 67 y.o. male with PMH noted below, presents for follow-up for neuropathy. Patient developed some hammertoes on the left foot along with pain around 1996, right foot with hammertoes and 2010, some buzzing and tingling sensation in the feet from around 2014 which is now relieved by gabapentin. His main symptoms are in the left lower extremity, he does not feel vibration or position sense in the left ankle, reduced light touch in the left leg as well. He sometimes has a positive Romberg, wide-based gait but overall walking is independent and falls are infrequent. Patient has 2 relatives with hereditary neuropathies, his sister who is 3years older than him is using a rolling walker and has significant painful paresthesias. Patient states he feels fortunate he does not have this degree of severity. We discussed results of NCS and EMG with findings suggestive of CMT type I. He had questions about the necessity of genetic testing and the benefit. We agreed that this would help us identify precise type and further characterize and prognosticate anticipated progression. His functional status is excellent, he continues to go to the gym, uses free weights and is careful not to use heavy barbell, often relies on using fixed machines and some body weight exercises. He does not have prediabetes based on prior hemoglobin A1c, thyroid was normal, B12 was normal.  Counseled patient on certain precautions with balance.       Current Relevant Medications:   Current Outpatient Medications   Medication Sig Dispense Refill    atorvastatin (LIPITOR) 20 MG tablet Take 1 tablet by

## 2023-12-04 ENCOUNTER — PATIENT MESSAGE (OUTPATIENT)
Dept: INTERNAL MEDICINE CLINIC | Facility: CLINIC | Age: 73
End: 2023-12-04

## 2023-12-04 DIAGNOSIS — I10 ESSENTIAL (PRIMARY) HYPERTENSION: Primary | ICD-10-CM

## 2023-12-04 DIAGNOSIS — I10 ESSENTIAL HYPERTENSION: ICD-10-CM

## 2023-12-04 DIAGNOSIS — E03.9 ACQUIRED HYPOTHYROIDISM: ICD-10-CM

## 2023-12-04 DIAGNOSIS — E78.2 MIXED HYPERLIPIDEMIA: ICD-10-CM

## 2023-12-04 RX ORDER — ATORVASTATIN CALCIUM 20 MG/1
20 TABLET, FILM COATED ORAL
Qty: 90 TABLET | Refills: 3 | Status: SHIPPED | OUTPATIENT
Start: 2023-12-04

## 2023-12-04 RX ORDER — LISINOPRIL 20 MG/1
20 TABLET ORAL DAILY
Qty: 90 TABLET | Refills: 3 | Status: SHIPPED | OUTPATIENT
Start: 2023-12-04

## 2023-12-04 NOTE — TELEPHONE ENCOUNTER
From: Josephine Robbins  To: Dr. Karyle Feelin2023 8:43 AM EST  Subject: Appointment on 2024 at 8:45    The above appointment is for an office visit. Are there any labs that need to be done prior to the appointment?     Cora De La Cruz

## 2023-12-04 NOTE — TELEPHONE ENCOUNTER
From: Rocio Robbins  To: Dr. Tiffanie Flor: 12/4/2023 8:49 AM EST  Subject: Generic Lipitor    Tonight I will take my last generic lipitor. Next dose is due on Thursday. Can you authorize a refill for this medication? [facility]erred pharmacy is [facility] [place] [place] [facility] [place]. It should be on my record.     Iver Door

## 2023-12-22 DIAGNOSIS — E03.9 ACQUIRED HYPOTHYROIDISM: ICD-10-CM

## 2023-12-22 DIAGNOSIS — I10 ESSENTIAL (PRIMARY) HYPERTENSION: ICD-10-CM

## 2023-12-22 DIAGNOSIS — E78.2 MIXED HYPERLIPIDEMIA: ICD-10-CM

## 2023-12-22 LAB
ALBUMIN SERPL-MCNC: 3.9 G/DL (ref 3.2–4.6)
ALBUMIN/GLOB SERPL: 1.4 (ref 0.4–1.6)
ALP SERPL-CCNC: 91 U/L (ref 50–136)
ALT SERPL-CCNC: 36 U/L (ref 12–65)
ANION GAP SERPL CALC-SCNC: 4 MMOL/L (ref 2–11)
AST SERPL-CCNC: 17 U/L (ref 15–37)
BASOPHILS # BLD: 0 K/UL (ref 0–0.2)
BASOPHILS NFR BLD: 1 % (ref 0–2)
BILIRUB SERPL-MCNC: 0.5 MG/DL (ref 0.2–1.1)
BUN SERPL-MCNC: 17 MG/DL (ref 8–23)
CALCIUM SERPL-MCNC: 8.7 MG/DL (ref 8.3–10.4)
CHLORIDE SERPL-SCNC: 108 MMOL/L (ref 103–113)
CHOLEST SERPL-MCNC: 131 MG/DL
CO2 SERPL-SCNC: 29 MMOL/L (ref 21–32)
CREAT SERPL-MCNC: 1.2 MG/DL (ref 0.8–1.5)
DIFFERENTIAL METHOD BLD: ABNORMAL
EOSINOPHIL # BLD: 0.1 K/UL (ref 0–0.8)
EOSINOPHIL NFR BLD: 1 % (ref 0.5–7.8)
ERYTHROCYTE [DISTWIDTH] IN BLOOD BY AUTOMATED COUNT: 13.3 % (ref 11.9–14.6)
GLOBULIN SER CALC-MCNC: 2.8 G/DL (ref 2.8–4.5)
GLUCOSE SERPL-MCNC: 104 MG/DL (ref 65–100)
HCT VFR BLD AUTO: 43.2 % (ref 41.1–50.3)
HDLC SERPL-MCNC: 34 MG/DL (ref 40–60)
HDLC SERPL: 3.9
HGB BLD-MCNC: 14.6 G/DL (ref 13.6–17.2)
IMM GRANULOCYTES # BLD AUTO: 0 K/UL (ref 0–0.5)
IMM GRANULOCYTES NFR BLD AUTO: 1 % (ref 0–5)
LDLC SERPL CALC-MCNC: 61.8 MG/DL
LYMPHOCYTES # BLD: 1.3 K/UL (ref 0.5–4.6)
LYMPHOCYTES NFR BLD: 25 % (ref 13–44)
MCH RBC QN AUTO: 29.6 PG (ref 26.1–32.9)
MCHC RBC AUTO-ENTMCNC: 33.8 G/DL (ref 31.4–35)
MCV RBC AUTO: 87.4 FL (ref 82–102)
MONOCYTES # BLD: 0.5 K/UL (ref 0.1–1.3)
MONOCYTES NFR BLD: 10 % (ref 4–12)
NEUTS SEG # BLD: 3.2 K/UL (ref 1.7–8.2)
NEUTS SEG NFR BLD: 62 % (ref 43–78)
NRBC # BLD: 0 K/UL (ref 0–0.2)
PLATELET # BLD AUTO: 136 K/UL (ref 150–450)
PMV BLD AUTO: 9.8 FL (ref 9.4–12.3)
POTASSIUM SERPL-SCNC: 4.6 MMOL/L (ref 3.5–5.1)
PROT SERPL-MCNC: 6.7 G/DL (ref 6.3–8.2)
RBC # BLD AUTO: 4.94 M/UL (ref 4.23–5.6)
SODIUM SERPL-SCNC: 141 MMOL/L (ref 136–146)
TRIGL SERPL-MCNC: 176 MG/DL (ref 35–150)
TSH, 3RD GENERATION: 1.43 UIU/ML (ref 0.36–3.74)
VLDLC SERPL CALC-MCNC: 35.2 MG/DL (ref 6–23)
WBC # BLD AUTO: 5.1 K/UL (ref 4.3–11.1)

## 2023-12-26 DIAGNOSIS — M79.672 ACUTE PAIN OF LEFT FOOT: Primary | ICD-10-CM

## 2023-12-26 RX ORDER — PREDNISONE 20 MG/1
20 TABLET ORAL 2 TIMES DAILY
Qty: 10 TABLET | Refills: 0 | Status: SHIPPED | OUTPATIENT
Start: 2023-12-26 | End: 2023-12-31

## 2023-12-26 NOTE — PROGRESS NOTES
Suspect acute gouty arthritic pain. Prednisone. See orders. Will follow up at visit 1/4/24    Examined while here with his wife.       Alfredo Escoto MD    Orders Placed This Encounter    predniSONE (DELTASONE) 20 MG tablet     Sig: Take 1 tablet by mouth 2 times daily for 5 days     Dispense:  10 tablet     Refill:  0
No

## 2023-12-27 DIAGNOSIS — D80.9 IMMUNODEFICIENCY WITH PREDOMINANTLY ANTIBODY DEFECTS, UNSPECIFIED (HCC): ICD-10-CM

## 2023-12-27 DIAGNOSIS — G62.9 NEUROPATHY: ICD-10-CM

## 2023-12-27 DIAGNOSIS — D69.6 DISORDER INVOLVING THROMBOCYTOPENIA (HCC): ICD-10-CM

## 2023-12-27 RX ORDER — GABAPENTIN 300 MG/1
300 CAPSULE ORAL DAILY
Qty: 90 CAPSULE | Refills: 0 | Status: SHIPPED | OUTPATIENT
Start: 2023-12-27 | End: 2024-01-04 | Stop reason: SDUPTHER

## 2024-01-04 ENCOUNTER — OFFICE VISIT (OUTPATIENT)
Dept: INTERNAL MEDICINE CLINIC | Facility: CLINIC | Age: 74
End: 2024-01-04
Payer: MEDICARE

## 2024-01-04 VITALS
HEIGHT: 73 IN | WEIGHT: 240 LBS | DIASTOLIC BLOOD PRESSURE: 82 MMHG | BODY MASS INDEX: 31.81 KG/M2 | OXYGEN SATURATION: 95 % | TEMPERATURE: 98 F | SYSTOLIC BLOOD PRESSURE: 134 MMHG | HEART RATE: 60 BPM

## 2024-01-04 DIAGNOSIS — D80.9 IMMUNODEFICIENCY WITH PREDOMINANTLY ANTIBODY DEFECTS, UNSPECIFIED (HCC): ICD-10-CM

## 2024-01-04 DIAGNOSIS — G62.9 NEUROPATHY: ICD-10-CM

## 2024-01-04 DIAGNOSIS — E03.9 ACQUIRED HYPOTHYROIDISM: ICD-10-CM

## 2024-01-04 DIAGNOSIS — E78.2 MIXED HYPERLIPIDEMIA: ICD-10-CM

## 2024-01-04 DIAGNOSIS — D69.6 DISORDER INVOLVING THROMBOCYTOPENIA (HCC): ICD-10-CM

## 2024-01-04 DIAGNOSIS — R79.9 ABNORMAL FINDING OF BLOOD CHEMISTRY, UNSPECIFIED: ICD-10-CM

## 2024-01-04 PROCEDURE — G8484 FLU IMMUNIZE NO ADMIN: HCPCS | Performed by: INTERNAL MEDICINE

## 2024-01-04 PROCEDURE — 3017F COLORECTAL CA SCREEN DOC REV: CPT | Performed by: INTERNAL MEDICINE

## 2024-01-04 PROCEDURE — 1036F TOBACCO NON-USER: CPT | Performed by: INTERNAL MEDICINE

## 2024-01-04 PROCEDURE — G8417 CALC BMI ABV UP PARAM F/U: HCPCS | Performed by: INTERNAL MEDICINE

## 2024-01-04 PROCEDURE — 1123F ACP DISCUSS/DSCN MKR DOCD: CPT | Performed by: INTERNAL MEDICINE

## 2024-01-04 PROCEDURE — G8427 DOCREV CUR MEDS BY ELIG CLIN: HCPCS | Performed by: INTERNAL MEDICINE

## 2024-01-04 PROCEDURE — 99214 OFFICE O/P EST MOD 30 MIN: CPT | Performed by: INTERNAL MEDICINE

## 2024-01-04 PROCEDURE — 3079F DIAST BP 80-89 MM HG: CPT | Performed by: INTERNAL MEDICINE

## 2024-01-04 PROCEDURE — 3075F SYST BP GE 130 - 139MM HG: CPT | Performed by: INTERNAL MEDICINE

## 2024-01-04 RX ORDER — GABAPENTIN 300 MG/1
300 CAPSULE ORAL DAILY
Qty: 30 CAPSULE | Refills: 2 | Status: SHIPPED | OUTPATIENT
Start: 2024-01-04 | End: 2024-04-03

## 2024-01-04 RX ORDER — LEVOTHYROXINE SODIUM 0.05 MG/1
50 TABLET ORAL
Qty: 90 TABLET | Refills: 3 | Status: SHIPPED | OUTPATIENT
Start: 2024-01-04

## 2024-01-04 RX ORDER — ATORVASTATIN CALCIUM 10 MG/1
10 TABLET, FILM COATED ORAL DAILY
Qty: 90 TABLET | Refills: 3 | Status: SHIPPED | OUTPATIENT
Start: 2024-01-04

## 2024-01-04 NOTE — PROGRESS NOTES
providers/suppliers regularly involved in providing care):   Patient Care Team:  Daniel Lyles MD as PCP - General (Internal Medicine)  Daniel Lyles MD as PCP - Empaneled Provider     Reviewed and updated this visit:  Tobacco  Allergies  Meds  Problems  Med Hx  Surg Hx  Soc Hx  Fam Hx

## 2024-04-18 ENCOUNTER — OFFICE VISIT (OUTPATIENT)
Dept: NEUROLOGY | Age: 74
End: 2024-04-18
Payer: MEDICARE

## 2024-04-18 VITALS
HEART RATE: 53 BPM | SYSTOLIC BLOOD PRESSURE: 131 MMHG | DIASTOLIC BLOOD PRESSURE: 76 MMHG | WEIGHT: 240.2 LBS | BODY MASS INDEX: 31.83 KG/M2 | HEIGHT: 73 IN | OXYGEN SATURATION: 97 %

## 2024-04-18 DIAGNOSIS — R26.0 SENSORY ATAXIC GAIT: ICD-10-CM

## 2024-04-18 DIAGNOSIS — G60.0 CMT (CHARCOT-MARIE-TOOTH DISEASE): Primary | ICD-10-CM

## 2024-04-18 DIAGNOSIS — R20.0 NUMBNESS AND TINGLING OF BOTH FEET: ICD-10-CM

## 2024-04-18 DIAGNOSIS — Z71.9 HEALTH EDUCATION/COUNSELING: ICD-10-CM

## 2024-04-18 DIAGNOSIS — R20.2 NUMBNESS AND TINGLING OF BOTH FEET: ICD-10-CM

## 2024-04-18 PROCEDURE — 1036F TOBACCO NON-USER: CPT | Performed by: PSYCHIATRY & NEUROLOGY

## 2024-04-18 PROCEDURE — 1123F ACP DISCUSS/DSCN MKR DOCD: CPT | Performed by: PSYCHIATRY & NEUROLOGY

## 2024-04-18 PROCEDURE — G8427 DOCREV CUR MEDS BY ELIG CLIN: HCPCS | Performed by: PSYCHIATRY & NEUROLOGY

## 2024-04-18 PROCEDURE — G8417 CALC BMI ABV UP PARAM F/U: HCPCS | Performed by: PSYCHIATRY & NEUROLOGY

## 2024-04-18 PROCEDURE — 3075F SYST BP GE 130 - 139MM HG: CPT | Performed by: PSYCHIATRY & NEUROLOGY

## 2024-04-18 PROCEDURE — 99215 OFFICE O/P EST HI 40 MIN: CPT | Performed by: PSYCHIATRY & NEUROLOGY

## 2024-04-18 PROCEDURE — 3078F DIAST BP <80 MM HG: CPT | Performed by: PSYCHIATRY & NEUROLOGY

## 2024-04-18 PROCEDURE — 3017F COLORECTAL CA SCREEN DOC REV: CPT | Performed by: PSYCHIATRY & NEUROLOGY

## 2024-04-18 ASSESSMENT — ENCOUNTER SYMPTOMS
COUGH: 0
BACK PAIN: 0
ABDOMINAL PAIN: 0
EYE PAIN: 0
SORE THROAT: 0

## 2024-04-18 ASSESSMENT — PATIENT HEALTH QUESTIONNAIRE - PHQ9
SUM OF ALL RESPONSES TO PHQ QUESTIONS 1-9: 0
SUM OF ALL RESPONSES TO PHQ QUESTIONS 1-9: 0
SUM OF ALL RESPONSES TO PHQ9 QUESTIONS 1 & 2: 0
SUM OF ALL RESPONSES TO PHQ QUESTIONS 1-9: 0
1. LITTLE INTEREST OR PLEASURE IN DOING THINGS: NOT AT ALL
2. FEELING DOWN, DEPRESSED OR HOPELESS: NOT AT ALL
SUM OF ALL RESPONSES TO PHQ QUESTIONS 1-9: 0

## 2024-04-18 NOTE — PROGRESS NOTES
[facility] NEUROLOGY FOLLOW-UP NOTE    Patient: Guillermo Robbins  Physician: Fabián Escobar MD    CC:   Chief Complaint   Patient presents with    Follow-up       PCP: Daniel Lyles MD  Referring Provider: No ref. provider found    History of Present Illness:     Interval History on 4/18/2024:  Guillermo Robbins is a 73 y.o.  male with genetically diagnosed CMT 1A (duplication of ), who presents for follow-up management of neuropathy.  Patient reports no major progression or worsening in his gait, continues to struggle with changes in elevation, able to walk on flat surfaces such as around the track.  Denies having  pain, or any significant worsening in numbness or tingling.  He utilizes shoe inserts for his high arches and met with a podiatrist to get the proper shoe size.  He continues to take gabapentin 300 mg nightly and states that he may have some left foot aching pain if he stops.      We reviewed his gene testing results and discussed how his other family members are doing.  We discussed the long-term prognosis of CMT Ia, how the sensory loss is contributory to his imbalance (sensory ataxia) and encouraged continued PT and attending the gym for exercises to strengthen his muscles and balance.  He is interested in a PT that may specialize in improving balance and understands the nuances of having peripheral neuropathy.  He does not smoke or drink, does not have other notable risk factors for exacerbating his neuropathy.      Original HPI:  Guillermo Robbins is a 72 y.o. male with PMH noted below, presents for follow-up for neuropathy.    Patient developed some hammertoes on the left foot along with pain around 1996, right foot with hammertoes and 2010, some buzzing and tingling sensation in the feet from around 2014 which is now relieved by gabapentin. His main symptoms are in the left lower extremity, he does not feel vibration or position sense in the left ankle, reduced light touch in the

## 2024-05-15 ENCOUNTER — OFFICE VISIT (OUTPATIENT)
Age: 74
End: 2024-05-15
Payer: MEDICARE

## 2024-05-15 DIAGNOSIS — G60.0 CMT (CHARCOT-MARIE-TOOTH DISEASE): ICD-10-CM

## 2024-05-15 DIAGNOSIS — R26.81 UNSTEADINESS ON FEET: ICD-10-CM

## 2024-05-15 DIAGNOSIS — R53.1 GENERALIZED WEAKNESS: ICD-10-CM

## 2024-05-15 DIAGNOSIS — R29.3 ABNORMAL POSTURE: ICD-10-CM

## 2024-05-15 DIAGNOSIS — M25.671 LIMITATION OF JOINT MOTION OF ANKLE, RIGHT: ICD-10-CM

## 2024-05-15 DIAGNOSIS — R26.9 GAIT ABNORMALITY: Primary | ICD-10-CM

## 2024-05-15 DIAGNOSIS — R26.0 SENSORY ATAXIC GAIT: ICD-10-CM

## 2024-05-15 DIAGNOSIS — M25.672 LIMITATION OF JOINT MOTION OF ANKLE, LEFT: ICD-10-CM

## 2024-05-15 PROCEDURE — 97161 PT EVAL LOW COMPLEX 20 MIN: CPT

## 2024-05-15 NOTE — PROGRESS NOTES
Physical Therapy Initial Assessment     Referring MD: Fabián Escobar MD  Diagnosis:     ICD-10-CM    1. Gait abnormality  R26.9       2. Unsteadiness on feet  R26.81       3. Generalized weakness  R53.1       4. Limitation of joint motion of ankle, right  M25.671       5. Limitation of joint motion of ankle, left  M25.672       6. Abnormal posture  R29.3          Therapy precautions:Fall risk and Gait belt for dynamic standing activity  Co-morbidities affecting plan of care: CMT  Total Timed Procedure Codes: 0 min, Total Time: 60 min      CLINICAL DECISION MAKING/ASSESSMENT     Personal Factors/co-morbidities affecting POC (1-2 Medium/3+High): age  habits/routines  co-morbidities as previously noted   Problem List: (1-2 Low/ 3 Medium/ 4+ High) ROM limitations  Strength deficits  Impaired posture  Impaired transfers  Impaired gait  Impaired balance/proprioception  Decreased endurance/activity Tolerance  Decreased [place] with Home Exercise Program    Clinical decision making: low complexity with therapist able to easily and confidently determine and predict expectations and future outcomes for the POC.  Prognosis: good   Benefits and precautions of treatment explained to patient.    Guillermo Robbins is a 73 y.o. male who presents to therapy today with a recent diagnosis of CMT.  During his evaluation he demonstrated some mild distal>proximal and L>RLE strength deficits as well as ankle DF ROM deficits.  Functionally this is leading to mild gait deviations and higher level balance deficits.  His fall risk is currently considered low however it would be best to address some of these deficits now when they are more mild than to defer interventions until they become a more significant issue as interventions will be less effective.  Mr. Robbins would benefit in a brief bout of PT to establish a HEP to address his LE strength, ROM and balance deficits.    PLAN OF CARE     Effective Dates: 5/15/2024 TO

## 2024-05-20 ENCOUNTER — OFFICE VISIT (OUTPATIENT)
Age: 74
End: 2024-05-20

## 2024-05-20 DIAGNOSIS — R26.81 UNSTEADINESS ON FEET: ICD-10-CM

## 2024-05-20 DIAGNOSIS — R29.3 ABNORMAL POSTURE: ICD-10-CM

## 2024-05-20 DIAGNOSIS — M25.671 LIMITATION OF JOINT MOTION OF ANKLE, RIGHT: ICD-10-CM

## 2024-05-20 DIAGNOSIS — M25.672 LIMITATION OF JOINT MOTION OF ANKLE, LEFT: ICD-10-CM

## 2024-05-20 DIAGNOSIS — R26.9 GAIT ABNORMALITY: Primary | ICD-10-CM

## 2024-05-20 DIAGNOSIS — R53.1 GENERALIZED WEAKNESS: ICD-10-CM

## 2024-05-20 NOTE — PROGRESS NOTES
Physical Therapy Daily Note     Referring MD: Fabián Escobar MD  Diagnosis:     ICD-10-CM    1. Gait abnormality  R26.9       2. Unsteadiness on feet  R26.81       3. Generalized weakness  R53.1       4. Limitation of joint motion of ankle, right  M25.671       5. Limitation of joint motion of ankle, left  M25.672       6. Abnormal posture  R29.3          Therapy precautions:Fall risk and Gait belt for dynamic standing activity  Co-morbidities affecting plan of care: CMT  Start Time: 3:02 PM    Stop Time: 4:00 PM  Total Timed Procedure Codes: 54 min, Total Time: 58 min  Visit Count: 2                                          Visits since last evaluative note: 1  POC Expiration: 8/7/2024    ASSESSMENT:   [] Progressing toward goals.  [] No change.  [x] Other: Able to establish HEP for LE strengthening, stretching and balance training.  He demonstrated quite a bit of difficulty with closed chain ankle DF during heel-toe raise.  With modifying the activity by performing with heels on 2\" step he was able to acquire more ankle DF ROM however positioning on the step had to be adjusted to improve the  advantage of ankle DF muscles.     PLAN:   PLAN FOR FUTURE VISITS:   [] Continue current frequency toward long and short term goals.    [x] Specific Instructions for subsequent treatments: Progress HEP at next visit.    SUBJECTIVE:     Pt reporting 0/10 pain and no falls since his last visit.       OBJECTIVE:     THERAPEUTIC EXERCISE: (54 minutes):    Walking on TM to improve functional LE strength with BUE support, CGA:  Forward stepping at 2.5 - 3.2 mph x 5:00  Side-stepping at 0.8 mph x 2:00 each direction  Retro-stepping at 1.0 mph x 2:00  Standing gastroc stretch, 2 x 1:00  Standing heel-toe raise with heels on edge of 2\" step to provide more relative ankle DF, 2 x 15 reps.  Standing hip abduction with YTB around ankles, 2 x 10 reps each LE.  Standing hip extension with YTB around ankles, 2 x 10 reps

## 2024-05-29 ENCOUNTER — OFFICE VISIT (OUTPATIENT)
Age: 74
End: 2024-05-29
Payer: MEDICARE

## 2024-05-29 DIAGNOSIS — M25.671 LIMITATION OF JOINT MOTION OF ANKLE, RIGHT: ICD-10-CM

## 2024-05-29 DIAGNOSIS — R29.3 ABNORMAL POSTURE: ICD-10-CM

## 2024-05-29 DIAGNOSIS — R26.9 GAIT ABNORMALITY: Primary | ICD-10-CM

## 2024-05-29 DIAGNOSIS — M25.672 LIMITATION OF JOINT MOTION OF ANKLE, LEFT: ICD-10-CM

## 2024-05-29 DIAGNOSIS — R26.81 UNSTEADINESS ON FEET: ICD-10-CM

## 2024-05-29 DIAGNOSIS — R53.1 GENERALIZED WEAKNESS: ICD-10-CM

## 2024-05-29 PROCEDURE — 97110 THERAPEUTIC EXERCISES: CPT

## 2024-05-29 PROCEDURE — 97112 NEUROMUSCULAR REEDUCATION: CPT

## 2024-05-29 NOTE — PROGRESS NOTES
Physical Therapy Daily Note     Referring MD: Fabián Escobar MD  Diagnosis:     ICD-10-CM    1. Gait abnormality  R26.9       2. Unsteadiness on feet  R26.81       3. Generalized weakness  R53.1       4. Limitation of joint motion of ankle, right  M25.671       5. Limitation of joint motion of ankle, left  M25.672       6. Abnormal posture  R29.3          Therapy precautions:Fall risk and Gait belt for dynamic standing activity  Co-morbidities affecting plan of care: CMT  Start Time: 1:02 PM    Stop Time: 2:00 PM  Total Timed Procedure Codes: 56 min, Total Time: 58 min  Visit Count: 3                                          Visits since last evaluative note: 2  POC Expiration: 8/7/2024    ASSESSMENT:   [] Progressing toward goals.  [] No change.  [x] Other: Pt tolerated progression of LE/core strengthening exercises well.  He was provided additional standing balance interventions which were difficult at times and he multiple LOB and require occasional assistance to recover.  He would benefit from 2-3 more visits before decreasing frequency or potentially discharging from PT.     PLAN:   PLAN FOR FUTURE VISITS:   [] Continue current frequency toward long and short term goals.    [x] Specific Instructions for subsequent treatments: Progress HEP at next visit.    SUBJECTIVE:     Pt reporting 0/10 pain and no falls since his last visit.  Reports compliance with HEP and says he actually performed some of his HEP in the pool.     OBJECTIVE:     THERAPEUTIC EXERCISE: (38 minutes):    Walking on TM to improve functional LE strength with BUE support, CGA:  Forward stepping at 2.5 - 3.2 mph x 5:00  Side-stepping at 0.8 mph x 2:00 each direction  Retro-stepping at 1.0 mph x 2:00  Standing gastroc stretch, 2 x 1:00  Standing heel-toe raise with heels on edge of 2\" step to provide more relative ankle DF, 2 x 15 reps.  Seated ankle DF with YTB resistance, 3 x 15 reps.  Standing anti-rotation press step-out with orange

## 2024-06-03 ENCOUNTER — OFFICE VISIT (OUTPATIENT)
Age: 74
End: 2024-06-03
Payer: MEDICARE

## 2024-06-03 DIAGNOSIS — R29.3 ABNORMAL POSTURE: ICD-10-CM

## 2024-06-03 DIAGNOSIS — R26.9 GAIT ABNORMALITY: Primary | ICD-10-CM

## 2024-06-03 DIAGNOSIS — M25.671 LIMITATION OF JOINT MOTION OF ANKLE, RIGHT: ICD-10-CM

## 2024-06-03 DIAGNOSIS — R26.81 UNSTEADINESS ON FEET: ICD-10-CM

## 2024-06-03 DIAGNOSIS — R53.1 GENERALIZED WEAKNESS: ICD-10-CM

## 2024-06-03 DIAGNOSIS — M25.672 LIMITATION OF JOINT MOTION OF ANKLE, LEFT: ICD-10-CM

## 2024-06-03 PROCEDURE — 97110 THERAPEUTIC EXERCISES: CPT

## 2024-06-03 PROCEDURE — 97112 NEUROMUSCULAR REEDUCATION: CPT

## 2024-06-03 NOTE — PROGRESS NOTES
Physical Therapy Daily Note     Referring MD: Fabián Escobar MD  Diagnosis:     ICD-10-CM    1. Gait abnormality  R26.9       2. Unsteadiness on feet  R26.81       3. Generalized weakness  R53.1       4. Limitation of joint motion of ankle, right  M25.671       5. Limitation of joint motion of ankle, left  M25.672       6. Abnormal posture  R29.3            Therapy precautions:Fall risk and Gait belt for dynamic standing activity  Co-morbidities affecting plan of care: CMT  Start Time: 1:02 PM    Stop Time: 2:00 PM  Total Timed Procedure Codes: 58 min, Total Time:  59 min  Visit Count: 4                                          Visits since last evaluative note: 3  POC Expiration: 8/7/2024    ASSESSMENT:   [] Progressing toward goals.  [] No change.  [x] Other: Mr. Robbins initially had a fair amount of instability with toe tapping to hurdles but this improved with repeated practice and VC to slow movement speed.  He continues to have functional distal LE strength deficits that do play a role in his gait instability under higher level activities but his fall risk is low. Pt would benefit from 1-2 more visits to further address dynamic balance deficits and progress his HEP.     PLAN:   PLAN FOR FUTURE VISITS:   [] Continue current frequency toward long and short term goals.    [x] Specific Instructions for subsequent treatments: Progress HEP at next visit.    SUBJECTIVE:     Pt reporting 0/10 pain and no falls since his last visit.  Reports compliance with HEP and says he actually performed some of his HEP in the pool.     OBJECTIVE:     THERAPEUTIC EXERCISE: (38 minutes):    Walking on TM to improve functional LE strength with BUE support, CGA:  Forward stepping at 2.5  mph x 5:00  Side-stepping at 0.8 mph x 2:00 each direction  Retro-stepping at 1.0 mph x 2:00  Standing gastroc stretch, x 1:00  Eccentrically resisted gait with orange superband, 50 ft x 4 reps  Laterally step with functional reach to BP

## 2024-06-11 ENCOUNTER — OFFICE VISIT (OUTPATIENT)
Age: 74
End: 2024-06-11
Payer: MEDICARE

## 2024-06-11 DIAGNOSIS — R29.3 ABNORMAL POSTURE: ICD-10-CM

## 2024-06-11 DIAGNOSIS — R53.1 GENERALIZED WEAKNESS: ICD-10-CM

## 2024-06-11 DIAGNOSIS — R26.9 GAIT ABNORMALITY: Primary | ICD-10-CM

## 2024-06-11 DIAGNOSIS — M25.671 LIMITATION OF JOINT MOTION OF ANKLE, RIGHT: ICD-10-CM

## 2024-06-11 DIAGNOSIS — R26.81 UNSTEADINESS ON FEET: ICD-10-CM

## 2024-06-11 DIAGNOSIS — M25.672 LIMITATION OF JOINT MOTION OF ANKLE, LEFT: ICD-10-CM

## 2024-06-11 PROCEDURE — 97110 THERAPEUTIC EXERCISES: CPT

## 2024-06-11 PROCEDURE — 97112 NEUROMUSCULAR REEDUCATION: CPT

## 2024-06-11 NOTE — PROGRESS NOTES
Physical Therapy Daily Note     Referring MD: Fabián Escobar MD  Diagnosis:     ICD-10-CM    1. Gait abnormality  R26.9       2. Unsteadiness on feet  R26.81       3. Generalized weakness  R53.1       4. Limitation of joint motion of ankle, right  M25.671       5. Limitation of joint motion of ankle, left  M25.672       6. Abnormal posture  R29.3          Therapy precautions:Fall risk and Gait belt for dynamic standing activity  Co-morbidities affecting plan of care: CMT  Start Time: 1:02 PM    Stop Time: 2:00 PM  Total Timed Procedure Codes: 55 min, Total Time:  58 min  Visit Count: 5                                          Visits since last evaluative note: 4  POC Expiration: 8/7/2024    ASSESSMENT:   [] Progressing toward goals.  [] No change.  [x] Other: Pt demonstrated improved stability and ease with hurdles and ambulation with toe-tap to target in congested space compared to his last visit.  With ball toss to rebounder, he demonstrated the most amount of instability in tandem stance and when on inclined foam wedge but required no more than [place].  Pt continues to have some high level balance deficits that require PT interventions to address.     PLAN:   PLAN FOR FUTURE VISITS:   [] Continue current frequency toward long and short term goals.    [x] Specific Instructions for subsequent treatments: Continue to progress HEP at next visit and address high level standing balance deficits.    SUBJECTIVE:     Pt reporting 0/10 pain and no falls since his last visit.  Reports compliance with HEP and says he actually performed some of his HEP in the pool.     OBJECTIVE:     THERAPEUTIC EXERCISE: (25 minutes):    Walking on TM to improve functional LE strength with BUE support, CGA:  Forward stepping at 2.5  mph x 5:00  Side-stepping at 0.8 mph x 2:00 each direction  Retro-stepping at 1.0 mph x 2:00    Standing gastroc stretch, x 1:00    Eccentrically resisted gait with orange superband, 50 ft x 4

## 2024-06-22 DIAGNOSIS — D80.9 IMMUNODEFICIENCY WITH PREDOMINANTLY ANTIBODY DEFECTS, UNSPECIFIED (HCC): ICD-10-CM

## 2024-06-22 DIAGNOSIS — D69.6 DISORDER INVOLVING THROMBOCYTOPENIA (HCC): ICD-10-CM

## 2024-06-22 DIAGNOSIS — G62.9 NEUROPATHY: ICD-10-CM

## 2024-06-24 RX ORDER — GABAPENTIN 300 MG/1
300 CAPSULE ORAL DAILY
Qty: 30 CAPSULE | Refills: 2 | Status: SHIPPED | OUTPATIENT
Start: 2024-06-24 | End: 2024-09-22

## 2024-06-25 ENCOUNTER — OFFICE VISIT (OUTPATIENT)
Age: 74
End: 2024-06-25
Payer: MEDICARE

## 2024-06-25 DIAGNOSIS — R29.3 ABNORMAL POSTURE: ICD-10-CM

## 2024-06-25 DIAGNOSIS — R26.81 UNSTEADINESS ON FEET: ICD-10-CM

## 2024-06-25 DIAGNOSIS — M25.671 LIMITATION OF JOINT MOTION OF ANKLE, RIGHT: ICD-10-CM

## 2024-06-25 DIAGNOSIS — R53.1 GENERALIZED WEAKNESS: ICD-10-CM

## 2024-06-25 DIAGNOSIS — M25.672 LIMITATION OF JOINT MOTION OF ANKLE, LEFT: ICD-10-CM

## 2024-06-25 DIAGNOSIS — R26.9 GAIT ABNORMALITY: Primary | ICD-10-CM

## 2024-06-25 PROCEDURE — 97110 THERAPEUTIC EXERCISES: CPT

## 2024-06-25 PROCEDURE — 97112 NEUROMUSCULAR REEDUCATION: CPT

## 2024-06-25 NOTE — PROGRESS NOTES
Physical Therapy Daily Note     Referring MD: Fabián Escobar MD  Diagnosis:     ICD-10-CM    1. Gait abnormality  R26.9       2. Unsteadiness on feet  R26.81       3. Generalized weakness  R53.1       4. Limitation of joint motion of ankle, right  M25.671       5. Limitation of joint motion of ankle, left  M25.672       6. Abnormal posture  R29.3            Therapy precautions:Fall risk and Gait belt for dynamic standing activity  Co-morbidities affecting plan of care: CMT  Start Time: 1:01 PM    Stop Time: 2:00 PM  Total Timed Procedure Codes: 55 min, Total Time:  59 min  Visit Count: 6                                          Visits since last evaluative note: 5  POC Expiration: 8/7/2024    ASSESSMENT:   [] Progressing toward goals.  [] No change.  [x] Other: Pt continues to demonstrate great benefit from participation in PT with improved stability for most activities performed today compared to his last visit.  Will likely be read for discharge from PT in 1-2 more visits.     PLAN:   PLAN FOR FUTURE VISITS:   [] Continue current frequency toward long and short term goals.    [x] Specific Instructions for subsequent treatments: Continue to progress HEP at next visit and address high level standing balance deficits.    SUBJECTIVE:     Pt reporting 0/10 pain and no falls since his last visit.  Reports compliance with HEP without any issues.     OBJECTIVE:     THERAPEUTIC EXERCISE: (25 minutes):    Walking on TM to improve functional LE strength with BUE support, CGA:  Forward stepping at 2.5  mph x 5:00  Side-stepping at 0.8 mph x 2:00 each direction  Retro-stepping at 1.0 mph x 2:00    Standing gastroc stretch, x 1:00    Eccentrically resisted gait with orange superband, 50 ft x 4 reps    Clock stepping with eccentrically resisted orange superband at waist level, x 2:00       NEUROMUSCULAR RE-EDUCATION: (30 minutes):    Standing toe-tap to variable surface height(6\" step, 8\" step with cone, and roller

## 2024-07-01 ENCOUNTER — NURSE ONLY (OUTPATIENT)
Dept: INTERNAL MEDICINE CLINIC | Facility: CLINIC | Age: 74
End: 2024-07-01

## 2024-07-01 DIAGNOSIS — G62.9 NEUROPATHY: ICD-10-CM

## 2024-07-01 DIAGNOSIS — D69.6 DISORDER INVOLVING THROMBOCYTOPENIA (HCC): ICD-10-CM

## 2024-07-01 DIAGNOSIS — D80.9 IMMUNODEFICIENCY WITH PREDOMINANTLY ANTIBODY DEFECTS, UNSPECIFIED (HCC): ICD-10-CM

## 2024-07-01 DIAGNOSIS — E78.2 MIXED HYPERLIPIDEMIA: ICD-10-CM

## 2024-07-01 DIAGNOSIS — R79.9 ABNORMAL FINDING OF BLOOD CHEMISTRY, UNSPECIFIED: ICD-10-CM

## 2024-07-01 DIAGNOSIS — E03.9 ACQUIRED HYPOTHYROIDISM: ICD-10-CM

## 2024-07-01 LAB
ALBUMIN SERPL-MCNC: 4.3 G/DL (ref 3.2–4.6)
ALBUMIN/GLOB SERPL: 2.2 (ref 1–1.9)
ALP SERPL-CCNC: 98 U/L (ref 40–129)
ALT SERPL-CCNC: 29 U/L (ref 12–65)
ANION GAP SERPL CALC-SCNC: 11 MMOL/L (ref 9–18)
APPEARANCE UR: CLEAR
AST SERPL-CCNC: 30 U/L (ref 15–37)
BILIRUB SERPL-MCNC: 0.6 MG/DL (ref 0–1.2)
BILIRUB UR QL: NEGATIVE
BUN SERPL-MCNC: 23 MG/DL (ref 8–23)
CALCIUM SERPL-MCNC: 9 MG/DL (ref 8.8–10.2)
CHLORIDE SERPL-SCNC: 102 MMOL/L (ref 98–107)
CHOLEST SERPL-MCNC: 98 MG/DL (ref 0–200)
CO2 SERPL-SCNC: 26 MMOL/L (ref 20–28)
COLOR UR: NORMAL
CREAT SERPL-MCNC: 1.23 MG/DL (ref 0.8–1.3)
ERYTHROCYTE [DISTWIDTH] IN BLOOD BY AUTOMATED COUNT: 13.4 % (ref 11.9–14.6)
EST. AVERAGE GLUCOSE BLD GHB EST-MCNC: 107 MG/DL
GLOBULIN SER CALC-MCNC: 2 G/DL (ref 2.3–3.5)
GLUCOSE SERPL-MCNC: 100 MG/DL (ref 70–99)
GLUCOSE UR STRIP.AUTO-MCNC: NEGATIVE MG/DL
HBA1C MFR BLD: 5.3 % (ref 0–5.6)
HCT VFR BLD AUTO: 44.2 % (ref 41.1–50.3)
HDLC SERPL-MCNC: 29 MG/DL (ref 40–60)
HDLC SERPL: 3.4 (ref 0–5)
HGB BLD-MCNC: 15 G/DL (ref 13.6–17.2)
HGB UR QL STRIP: NEGATIVE
KETONES UR QL STRIP.AUTO: NEGATIVE MG/DL
LDLC SERPL CALC-MCNC: 38 MG/DL (ref 0–100)
LEUKOCYTE ESTERASE UR QL STRIP.AUTO: NEGATIVE
MCH RBC QN AUTO: 29.7 PG (ref 26.1–32.9)
MCHC RBC AUTO-ENTMCNC: 33.9 G/DL (ref 31.4–35)
MCV RBC AUTO: 87.5 FL (ref 82–102)
NITRITE UR QL STRIP.AUTO: NEGATIVE
NRBC # BLD: 0 K/UL (ref 0–0.2)
PH UR STRIP: 5.5 (ref 5–9)
PLATELET # BLD AUTO: 159 K/UL (ref 150–450)
PMV BLD AUTO: 10.4 FL (ref 9.4–12.3)
POTASSIUM SERPL-SCNC: 4.5 MMOL/L (ref 3.5–5.1)
PROT SERPL-MCNC: 6.3 G/DL (ref 6.3–8.2)
PROT UR STRIP-MCNC: NEGATIVE MG/DL
RBC # BLD AUTO: 5.05 M/UL (ref 4.23–5.6)
SODIUM SERPL-SCNC: 138 MMOL/L (ref 136–145)
SP GR UR REFRACTOMETRY: 1.01 (ref 1–1.02)
TRIGL SERPL-MCNC: 154 MG/DL (ref 0–150)
TSH, 3RD GENERATION: 2 UIU/ML (ref 0.27–4.2)
UROBILINOGEN UR QL STRIP.AUTO: 0.2 EU/DL (ref 0.2–1)
VLDLC SERPL CALC-MCNC: 31 MG/DL (ref 6–23)
WBC # BLD AUTO: 5.8 K/UL (ref 4.3–11.1)

## 2024-07-02 ENCOUNTER — OFFICE VISIT (OUTPATIENT)
Age: 74
End: 2024-07-02
Payer: MEDICARE

## 2024-07-02 DIAGNOSIS — M25.672 LIMITATION OF JOINT MOTION OF ANKLE, LEFT: ICD-10-CM

## 2024-07-02 DIAGNOSIS — R53.1 GENERALIZED WEAKNESS: ICD-10-CM

## 2024-07-02 DIAGNOSIS — R26.81 UNSTEADINESS ON FEET: ICD-10-CM

## 2024-07-02 DIAGNOSIS — M25.671 LIMITATION OF JOINT MOTION OF ANKLE, RIGHT: ICD-10-CM

## 2024-07-02 DIAGNOSIS — R26.9 GAIT ABNORMALITY: Primary | ICD-10-CM

## 2024-07-02 DIAGNOSIS — R29.3 ABNORMAL POSTURE: ICD-10-CM

## 2024-07-02 PROCEDURE — 97112 NEUROMUSCULAR REEDUCATION: CPT

## 2024-07-02 PROCEDURE — 97116 GAIT TRAINING THERAPY: CPT

## 2024-07-02 NOTE — PROGRESS NOTES
Physical Therapy Discharge     Referring MD: Fabián Escobar MD  Diagnosis:     ICD-10-CM    1. Gait abnormality  R26.9       2. Unsteadiness on feet  R26.81       3. Generalized weakness  R53.1       4. Limitation of joint motion of ankle, right  M25.671       5. Limitation of joint motion of ankle, left  M25.672       6. Abnormal posture  R29.3         Therapy precautions:Fall risk and Gait belt for dynamic standing activity  Co-morbidities affecting plan of care: CMT  Start Time: 1:03 PM    Stop Time: 2:00 PM  Total Timed Procedure Codes: 53 min, Total Time:  57 min  Visit Count: 7                                          Visits since last evaluative note: 6  POC Expiration: 8/7/2024    ASSESSMENT:   [] Progressing toward goals.  [] No change.  [x] Other: Mr. Robbins has been seen for 6 visits following his initial PT evaluation with interventions focused on improving standing balance, gait stability under varying task demands and establishing a comprehensive HEP.  He has tolerated all interventions well and achieved all LTG.  He continues to have mild distal>proximal BLE strength deficits which is consistent with his dx of CMT however his gait stability and standing balance is appropriate given his age aside from tandem, single leg and other narrowed standing bases of support deficits.  He has a comprehensive HEP at this time and is appropriate for discharge from PT however given the progressive nature of his condition, he would likely benefit from return to PT at some point in the future to address his changing balance and mobility needs.     PLAN:   PLAN FOR FUTURE VISITS:   [] Continue current frequency toward long and short term goals.    [x] Specific Instructions for subsequent treatments: Discharge from PT to HEP.    SUBJECTIVE:     Pt reporting 0/10 pain and no falls since his last visit.  Reports compliance with HEP without any issues.  Says he feels pretty good with his walking, balance and

## 2024-07-23 ENCOUNTER — OFFICE VISIT (OUTPATIENT)
Dept: INTERNAL MEDICINE CLINIC | Facility: CLINIC | Age: 74
End: 2024-07-23
Payer: MEDICARE

## 2024-07-23 VITALS
DIASTOLIC BLOOD PRESSURE: 78 MMHG | TEMPERATURE: 98.6 F | WEIGHT: 237 LBS | OXYGEN SATURATION: 98 % | HEIGHT: 73 IN | HEART RATE: 57 BPM | SYSTOLIC BLOOD PRESSURE: 136 MMHG | BODY MASS INDEX: 31.41 KG/M2

## 2024-07-23 DIAGNOSIS — Z00.00 MEDICARE ANNUAL WELLNESS VISIT, SUBSEQUENT: Primary | ICD-10-CM

## 2024-07-23 DIAGNOSIS — G47.33 OBSTRUCTIVE SLEEP APNEA: ICD-10-CM

## 2024-07-23 DIAGNOSIS — G62.9 NEUROPATHY: ICD-10-CM

## 2024-07-23 DIAGNOSIS — D69.6 DISORDER INVOLVING THROMBOCYTOPENIA (HCC): ICD-10-CM

## 2024-07-23 DIAGNOSIS — I10 ESSENTIAL (PRIMARY) HYPERTENSION: ICD-10-CM

## 2024-07-23 DIAGNOSIS — E03.9 ACQUIRED HYPOTHYROIDISM: ICD-10-CM

## 2024-07-23 DIAGNOSIS — E78.2 MIXED HYPERLIPIDEMIA: ICD-10-CM

## 2024-07-23 DIAGNOSIS — N40.1 BENIGN PROSTATIC HYPERPLASIA WITH WEAK URINARY STREAM: ICD-10-CM

## 2024-07-23 DIAGNOSIS — Z23 NEED FOR PROPHYLACTIC VACCINATION AGAINST DIPHTHERIA-TETANUS-PERTUSSIS (DTP): ICD-10-CM

## 2024-07-23 DIAGNOSIS — Z23 NEED FOR PROPHYLACTIC VACCINATION AND INOCULATION AGAINST VARICELLA: ICD-10-CM

## 2024-07-23 DIAGNOSIS — I10 ESSENTIAL HYPERTENSION: ICD-10-CM

## 2024-07-23 DIAGNOSIS — N52.9 ERECTILE DYSFUNCTION, UNSPECIFIED ERECTILE DYSFUNCTION TYPE: ICD-10-CM

## 2024-07-23 DIAGNOSIS — R39.12 BENIGN PROSTATIC HYPERPLASIA WITH WEAK URINARY STREAM: ICD-10-CM

## 2024-07-23 DIAGNOSIS — D80.9 IMMUNODEFICIENCY WITH PREDOMINANTLY ANTIBODY DEFECTS, UNSPECIFIED (HCC): ICD-10-CM

## 2024-07-23 LAB — PSA SERPL-MCNC: 0.7 NG/ML (ref 0–4)

## 2024-07-23 PROCEDURE — 1123F ACP DISCUSS/DSCN MKR DOCD: CPT | Performed by: INTERNAL MEDICINE

## 2024-07-23 PROCEDURE — 99214 OFFICE O/P EST MOD 30 MIN: CPT | Performed by: INTERNAL MEDICINE

## 2024-07-23 PROCEDURE — 3075F SYST BP GE 130 - 139MM HG: CPT | Performed by: INTERNAL MEDICINE

## 2024-07-23 PROCEDURE — G8427 DOCREV CUR MEDS BY ELIG CLIN: HCPCS | Performed by: INTERNAL MEDICINE

## 2024-07-23 PROCEDURE — G0439 PPPS, SUBSEQ VISIT: HCPCS | Performed by: INTERNAL MEDICINE

## 2024-07-23 PROCEDURE — 3078F DIAST BP <80 MM HG: CPT | Performed by: INTERNAL MEDICINE

## 2024-07-23 PROCEDURE — G8417 CALC BMI ABV UP PARAM F/U: HCPCS | Performed by: INTERNAL MEDICINE

## 2024-07-23 PROCEDURE — 3017F COLORECTAL CA SCREEN DOC REV: CPT | Performed by: INTERNAL MEDICINE

## 2024-07-23 PROCEDURE — 1036F TOBACCO NON-USER: CPT | Performed by: INTERNAL MEDICINE

## 2024-07-23 RX ORDER — LEVOTHYROXINE SODIUM 0.05 MG/1
50 TABLET ORAL
Qty: 90 TABLET | Refills: 3 | Status: SHIPPED | OUTPATIENT
Start: 2024-07-23

## 2024-07-23 RX ORDER — RESPIRATORY SYNCYTIAL VISUS VACCINE RECOMBINANT, ADJUVANTED 120MCG/0.5
0.5 KIT INTRAMUSCULAR ONCE
Qty: 0.5 ML | Refills: 0 | Status: SHIPPED | OUTPATIENT
Start: 2024-07-23 | End: 2024-07-23

## 2024-07-23 RX ORDER — LISINOPRIL 20 MG/1
20 TABLET ORAL DAILY
Qty: 90 TABLET | Refills: 3 | Status: SHIPPED | OUTPATIENT
Start: 2024-07-23

## 2024-07-23 RX ORDER — TADALAFIL 10 MG/1
10 TABLET ORAL PRN
Qty: 30 TABLET | Refills: 3 | Status: SHIPPED | OUTPATIENT
Start: 2024-07-23

## 2024-07-23 RX ORDER — GABAPENTIN 300 MG/1
300 CAPSULE ORAL DAILY
Qty: 30 CAPSULE | Refills: 5 | Status: SHIPPED | OUTPATIENT
Start: 2024-07-23 | End: 2025-01-19

## 2024-07-23 RX ORDER — ATORVASTATIN CALCIUM 10 MG/1
10 TABLET, FILM COATED ORAL DAILY
Qty: 90 TABLET | Refills: 3 | Status: SHIPPED | OUTPATIENT
Start: 2024-07-23

## 2024-07-23 NOTE — ASSESSMENT & PLAN NOTE
Monitored by specialist- no acute findings meriting change in the plan  Following with [place] allergy.  Pneumococcal vaccines current.    No gross issue with immunodeficiency.   Nasal rinse with Bactroban and Mometasone.

## 2024-07-23 NOTE — ASSESSMENT & PLAN NOTE
Treatment regimen is effective.       Lab Results   Component Value Date/Time     07/01/2024 08:52 AM    K 4.5 07/01/2024 08:52 AM     07/01/2024 08:52 AM    CO2 26 07/01/2024 08:52 AM    BUN 23 07/01/2024 08:52 AM    CREATININE 1.23 07/01/2024 08:52 AM    GLUCOSE 100 07/01/2024 08:52 AM    CALCIUM 9.0 07/01/2024 08:52 AM    LABGLOM 62 07/01/2024 08:52 AM    LABGLOM >60 12/22/2023 08:57 AM      Hypertension Medications       ACE Inhibitors       lisinopril (PRINIVIL;ZESTRIL) 20 MG tablet Take 1 tablet by mouth daily

## 2024-07-23 NOTE — ASSESSMENT & PLAN NOTE
Monitored by specialist- no acute findings meriting change in the plan  Lab Results   Component Value Date    WBC 5.8 07/01/2024    HGB 15.0 07/01/2024    HCT 44.2 07/01/2024    MCV 87.5 07/01/2024     07/01/2024     Treatment regimen is effective.

## 2024-07-23 NOTE — ASSESSMENT & PLAN NOTE
Well-controlled, continue current medications  Lab Results   Component Value Date    PSA 0.9 07/03/2023       The natural history of prostate cancer and ongoing controversy regarding screening and potential treatment outcomes of prostate cancer has been discussed with the patient. The meaning of a false positive PSA and a false negative PSA has been discussed. He indicates understanding of the limitations of this screening test and wishes to proceed with screening PSA testing.  Discussed at length and will have this draw today at his request.

## 2024-07-23 NOTE — ASSESSMENT & PLAN NOTE
Treatment regimen is effective.       Lab Results   Component Value Date    CHOL 98 07/01/2024    TRIG 154 (H) 07/01/2024    HDL 29 (L) 07/01/2024    LDL 38 07/01/2024    VLDL 31 (H) 07/01/2024    CHOLHDLRATIO 3.4 07/01/2024     Lipid Lowering Medications       HMG CoA Reductase Inhibitors       atorvastatin (LIPITOR) 10 MG tablet Take 1 tablet by mouth daily          Literature reviewed and considered in regards to LDL management and treatment with statin medications; considering current age, comorbid medical conditions; considering the risks vs benefits    Dayton RN, Ciarra ALMONTE, Dell OG, Nomi Zuniga A, Sebastian DG, Diashy CC. Treatment with Statins in Elderly Patients. [facility] ([facility]). 2019 Oct 30;55(11):721.    Jane K, Vic SH. Effects of Statins for Primary Prevention in the Elderly: Recent Evidence. J Lipid Atheroscler. 2020 Jan;9(1):1-7.    Niki MG, Malika A, Marly MB, Erica AM. Primary prevention statin therapy in older adults. Curr Opin Cardiol. 2023 Jan 1;38(1):11-20

## 2024-07-23 NOTE — ASSESSMENT & PLAN NOTE
Treatment regimen is effective.     Sleep apnea is well controlled with residual AHI of only 1.3 events per hour. I will keep his pressure settings the same, AutoSet 5 to 18 cm H2O. He uses a fullface mask. I will renew supply order to [facility].

## 2024-07-23 NOTE — PATIENT INSTRUCTIONS
The medication list included in this document is our record of what you are currently taking, including any changes that were made at today's visit.  If you find any differences when compared to your medications at home, or have any questions that were not answered at your visit, please contact the office.     Follow up in six months with labs prior.  See orders.     Daniel Lyles MD   Starting a Weight Loss Plan: Care Instructions  Overview     It can be a challenge to lose weight. But your doctor can help you make a weight-loss plan that meets your needs.  You don't have to make a lot of big changes at once. A better idea might be to focus on small changes and stick with them. When those changes become habit, you can add a few more changes.  Some people find it helpful to take an exercise or nutrition class. If you have questions, ask your doctor about seeing a registered dietitian or an exercise specialist. You might also think about joining a weight-loss support group.  If you're not ready to make changes right now, try to pick a date in the future. Then make an appointment with your doctor to talk about when and how you'll get started with a plan.  Follow-up care is a key part of your treatment and safety. Be sure to make and go to all appointments, and call your doctor if you are having problems. It's also a good idea to know your test results and keep a list of the medicines you take.  How can you care for yourself at home?  Set realistic goals. Many people expect to lose much more weight than is likely. A weight loss of 5% to 10% of your body weight may be enough to improve your health.  Get family and friends involved to provide support. Talk to them about why you are trying to lose weight, and ask them to help. They can help by participating in exercise and having meals with you, even if they may be eating something different.  Find what works best for you. If you do not have time or do not like to

## 2024-07-23 NOTE — PROGRESS NOTES
Medicare Annual Wellness Visit    Guillermo Robbins is here for Hypertension (6 month follow up,lab review), Hypothyroidism, Hyperlipidemia, and Medicare AWV    Assessment & Plan   Medicare annual wellness visit, subsequent  Need for prophylactic vaccination against diphtheria-tetanus-pertussis (DTP)  -     Tdap (ADACEL) 5-2-15.5 LF-MCG/0.5 injection; Inject 0.5 mLs into the muscle once for 1 dose, Disp-0.5 mL, R-0Print  Need for prophylactic vaccination and inoculation against varicella  -     zoster recombinant adjuvanted vaccine (SHINGRIX) 50 MCG/0.5ML SUSR injection; Inject 0.5 mLs into the muscle once for 1 dose, Disp-0.5 mL, R-0Print  Recommendations for Preventive Services Due: see orders and patient instructions/AVS.  Recommended screening schedule for the next 5-10 years is provided to the patient in written form: see Patient Instructions/AVS.     Return in 1 year (on 7/23/2025) for Medicare AWV.     Subjective   The following acute and/or chronic problems were also addressed today:  Guillermo Robbins 73 y.o. White (non-) male     See note.     Patient's complete Health Risk Assessment and screening values have been reviewed and are found in Flowsheets. The following problems were reviewed today and where indicated follow up appointments were made and/or referrals ordered.    Positive Risk Factor Screenings with Interventions:                  Abnormal BMI (obese):  Body mass index is 31.7 kg/m². (!) Abnormal  Interventions:  See counseling/recommendations below    Obesity Counseling: Patient was asked about his current diet and exercise habits, and personalized advice was provided regarding recommended lifestyle changes. Patient's comorbid health conditions associated with elevated BMI were discussed, as well as the likely benefits of weight loss. Based upon patient's motivation to change his behavior, the following plan was agreed upon to work toward a weight loss goal of  pounds: lower carbohydrate 
Previously evaluated bone marrow biopsy.    Neuropathy    He has taken gabapentin for years.  Family history of Charcot-Blanca-Tooth and his sister?  Neuropathy symptoms mostly at night.  No leg weakness to the proximal leg weakness.  No fevers chills or sweats.  We discussed at length.  The etiology of his neuropathy is unknown.  Multifactorial?  7/23/24 -->> would like to see podiatry for neuropathy and foot care due to neuropathy and to help improve balance.  Requests to see Dr. Braxton     Previously followed at [facility] by Dr. Stanton Laguna.  Most recently he followed up with internal medicine May 19 and notes reviewed. Patient is here for follow-up and management of chronic medical conditions, review of recent labs, review of any imaging completed since our last office visit and discuss any consultants opinions or management changes.       Tobacco Use      Smoking status: Never      Smokeless tobacco: Never      Echo 5/2022- EF 55-65%    Normal left ventricular diastolic function.  · The right ventricular systolic function is normal.  · Mild aortic valve calcification without stenosis.  · No significant valvular abnormalities.    MINNIE  CPAP Compliance:  4/12/2022 - 5/11/2022  Usage days greater than 4 hours 100%  Average usage 7 hours 27 minutes  AutoSet 5 to 17 cm H2O  95th percentile pressure 16.9 cm H2O  95th percentile leak 3.5 L/min  AHI 1.6 events per hour    Colonoscopy 2022.  Follow up in 3 yr with Dr. Lynn, personal history of a sessile serrated adenoma on his last colonoscopy of 2/18/2019. In addition he has a history of colon cancer in his mother.     Hx of low PLT.  No bleeding.  ITP?  Stable.  Was previously evaluated by Dr. Gunter.  No f/c/s wt loss. Previously evaluated bone marrow biopsy.    Neuropathy  No prior nerve conduction study.  He has taken gabapentin for years.  Family history of Charcot-Blanca-Tooth and his sister?  Neuropathy symptoms mostly at night.  No leg

## 2024-07-23 NOTE — ASSESSMENT & PLAN NOTE
Lab Results   Component Value Date    TSH 2.000 07/01/2024     Treatment regimen is effective.   Continue Synthroid 50 mcg daily

## 2024-10-08 ENCOUNTER — OFFICE VISIT (OUTPATIENT)
Dept: INTERNAL MEDICINE CLINIC | Facility: CLINIC | Age: 74
End: 2024-10-08
Payer: MEDICARE

## 2024-10-08 VITALS
SYSTOLIC BLOOD PRESSURE: 138 MMHG | WEIGHT: 235.6 LBS | HEART RATE: 52 BPM | TEMPERATURE: 97.7 F | BODY MASS INDEX: 31.23 KG/M2 | HEIGHT: 73 IN | OXYGEN SATURATION: 97 % | DIASTOLIC BLOOD PRESSURE: 80 MMHG

## 2024-10-08 DIAGNOSIS — S91.119A LACERATION OF TOE OF RIGHT FOOT WITHOUT FOREIGN BODY PRESENT OR DAMAGE TO NAIL, UNSPECIFIED TOE, INITIAL ENCOUNTER: Primary | ICD-10-CM

## 2024-10-08 PROCEDURE — 1123F ACP DISCUSS/DSCN MKR DOCD: CPT | Performed by: NURSE PRACTITIONER

## 2024-10-08 PROCEDURE — 3075F SYST BP GE 130 - 139MM HG: CPT | Performed by: NURSE PRACTITIONER

## 2024-10-08 PROCEDURE — 3079F DIAST BP 80-89 MM HG: CPT | Performed by: NURSE PRACTITIONER

## 2024-10-08 PROCEDURE — 3017F COLORECTAL CA SCREEN DOC REV: CPT | Performed by: NURSE PRACTITIONER

## 2024-10-08 PROCEDURE — 1036F TOBACCO NON-USER: CPT | Performed by: NURSE PRACTITIONER

## 2024-10-08 PROCEDURE — G8417 CALC BMI ABV UP PARAM F/U: HCPCS | Performed by: NURSE PRACTITIONER

## 2024-10-08 PROCEDURE — G8484 FLU IMMUNIZE NO ADMIN: HCPCS | Performed by: NURSE PRACTITIONER

## 2024-10-08 PROCEDURE — 99213 OFFICE O/P EST LOW 20 MIN: CPT | Performed by: NURSE PRACTITIONER

## 2024-10-08 PROCEDURE — G8427 DOCREV CUR MEDS BY ELIG CLIN: HCPCS | Performed by: NURSE PRACTITIONER

## 2024-10-08 RX ORDER — GABAPENTIN 300 MG/1
300 CAPSULE ORAL DAILY
Qty: 30 CAPSULE | Refills: 5 | Status: CANCELLED | OUTPATIENT
Start: 2024-10-08 | End: 2025-04-06

## 2024-10-08 ASSESSMENT — ENCOUNTER SYMPTOMS
COUGH: 0
WHEEZING: 0
SHORTNESS OF BREATH: 0

## 2024-10-08 NOTE — PROGRESS NOTES
[facility]      10/8/2024    Patient Name: Guillermo Robbins  :  1950      Chief Complaint:  Chief Complaint   Patient presents with    Other     Pt stats he is here to get Sutures removed out of the bottom of his toe.          HPI  Patient presents today for suture removal. He was seen in the ER on 24 after sustaining a laceration to his right 3rd toe as a result of stepping on the metal edging of his garage door. Three sutures placed in the ER. Doing well and without concern today.       Past Medical History:   Diagnosis Date    Neuropathy        Past Surgical History:   Procedure Laterality Date    BLEPHAROPLASTY      right    SINUS SURGERY      TONSILLECTOMY  1970       Family History   Problem Relation Age of Onset    Breast Cancer Mother     Cancer Mother         colon    Neuropathy Mother     Stroke Father 70    Heart Attack Father        Social History     Tobacco Use    Smoking status: Never    Smokeless tobacco: Never   Vaping Use    Vaping status: Never Used   Substance Use Topics    Alcohol use: Never    Drug use: Never       Current Outpatient Medications:     atorvastatin (LIPITOR) 10 MG tablet, Take 1 tablet by mouth daily, Disp: 90 tablet, Rfl: 3    gabapentin (NEURONTIN) 300 MG capsule, Take 1 capsule by mouth daily for 180 days., Disp: 30 capsule, Rfl: 5    levothyroxine (SYNTHROID) 50 MCG tablet, Take 1 tablet by mouth every morning (before breakfast), Disp: 90 tablet, Rfl: 3    lisinopril (PRINIVIL;ZESTRIL) 20 MG tablet, Take 1 tablet by mouth daily, Disp: 90 tablet, Rfl: 3    tadalafil (CIALIS) 10 MG tablet, Take 1 tablet by mouth as needed for Erectile Dysfunction, Disp: 30 tablet, Rfl: 3    Azelastine HCl 137 MCG/SPRAY SOLN, , Disp: , Rfl:     doxycycline hyclate (VIBRAMYCIN) 100 MG capsule, Take 1 capsule by mouth daily, Disp: , Rfl:     ketoconazole (NIZORAL) 2 % shampoo, , Disp: , Rfl:     montelukast (SINGULAIR) 10 MG tablet, , Disp: , Rfl:     Coenzyme Q10

## 2024-10-18 ENCOUNTER — OFFICE VISIT (OUTPATIENT)
Dept: NEUROLOGY | Age: 74
End: 2024-10-18
Payer: MEDICARE

## 2024-10-18 VITALS
BODY MASS INDEX: 32.07 KG/M2 | HEART RATE: 49 BPM | OXYGEN SATURATION: 95 % | HEIGHT: 73 IN | DIASTOLIC BLOOD PRESSURE: 79 MMHG | SYSTOLIC BLOOD PRESSURE: 129 MMHG | WEIGHT: 242 LBS

## 2024-10-18 DIAGNOSIS — E03.9 ACQUIRED HYPOTHYROIDISM: ICD-10-CM

## 2024-10-18 DIAGNOSIS — R53.83 OTHER FATIGUE: ICD-10-CM

## 2024-10-18 DIAGNOSIS — G62.9 NEUROPATHY: ICD-10-CM

## 2024-10-18 DIAGNOSIS — G47.33 OBSTRUCTIVE SLEEP APNEA: ICD-10-CM

## 2024-10-18 DIAGNOSIS — G60.0 CMT (CHARCOT-MARIE-TOOTH DISEASE): Primary | ICD-10-CM

## 2024-10-18 PROCEDURE — 3078F DIAST BP <80 MM HG: CPT | Performed by: PSYCHIATRY & NEUROLOGY

## 2024-10-18 PROCEDURE — G8484 FLU IMMUNIZE NO ADMIN: HCPCS | Performed by: PSYCHIATRY & NEUROLOGY

## 2024-10-18 PROCEDURE — 99215 OFFICE O/P EST HI 40 MIN: CPT | Performed by: PSYCHIATRY & NEUROLOGY

## 2024-10-18 PROCEDURE — 1036F TOBACCO NON-USER: CPT | Performed by: PSYCHIATRY & NEUROLOGY

## 2024-10-18 PROCEDURE — 3017F COLORECTAL CA SCREEN DOC REV: CPT | Performed by: PSYCHIATRY & NEUROLOGY

## 2024-10-18 PROCEDURE — 1123F ACP DISCUSS/DSCN MKR DOCD: CPT | Performed by: PSYCHIATRY & NEUROLOGY

## 2024-10-18 PROCEDURE — G8417 CALC BMI ABV UP PARAM F/U: HCPCS | Performed by: PSYCHIATRY & NEUROLOGY

## 2024-10-18 PROCEDURE — G8427 DOCREV CUR MEDS BY ELIG CLIN: HCPCS | Performed by: PSYCHIATRY & NEUROLOGY

## 2024-10-18 PROCEDURE — 3074F SYST BP LT 130 MM HG: CPT | Performed by: PSYCHIATRY & NEUROLOGY

## 2024-10-18 NOTE — PROGRESS NOTES
[facility] NEUROLOGY FOLLOW-UP NOTE    Patient: Guillermo Robbins  Physician: Fabián Escobar MD    CC:   Chief Complaint   Patient presents with    Follow-up     Patient states since last visit he did Physical Theray. He states he has improved since then. He also has been doing some gym exercises which has helped     PCP: Daniel Lyles MD  Referring Provider: No ref. provider found    History of Present Illness:     Interval History on 10/18/2024:  Guillermo Robbins is a 73 y.o. male with PMH of CMT 1A, presents for follow-up management of neuropathy.  Initially neuropathic \"buzzing\" in the left leg, then bilateral.  He reports that he has not seen drastic change in symptoms from prior, he finds that the Gabapentin 300 mg nightly is enough for him.  He feels that he has made significant progress by attending PT sessions, he has been working on certain exercises on his own at home.  Main complaint today was daytime fatigue.  Explained that he is taking a low-dose of gabapentin and that his B12 level has been in the normal ranges.  His TSH is also normal.       Prior relevant documentation:  Interval History on 4/18/2024:  Guillermo Robbins is a 73 y.o.  male with genetically diagnosed CMT 1A (duplication of ), who presents for follow-up management of neuropathy.  Patient reports no major progression or worsening in his gait, continues to struggle with changes in elevation, able to walk on flat surfaces such as around the track.  Denies having  pain, or any significant worsening in numbness or tingling.  He utilizes shoe inserts for his high arches and met with a podiatrist to get the proper shoe size.  He continues to take gabapentin 300 mg nightly and states that he may have some left foot aching pain if he stops.      We reviewed his gene testing results and discussed how his other family members are doing.  We discussed the long-term prognosis of CMT Ia, how the sensory loss is contributory to his

## 2024-11-24 DIAGNOSIS — I10 ESSENTIAL HYPERTENSION: ICD-10-CM

## 2024-11-25 RX ORDER — LISINOPRIL 20 MG/1
20 TABLET ORAL DAILY
Qty: 90 TABLET | Refills: 3 | Status: SHIPPED | OUTPATIENT
Start: 2024-11-25

## 2025-01-17 ENCOUNTER — LAB (OUTPATIENT)
Dept: INTERNAL MEDICINE CLINIC | Facility: CLINIC | Age: 75
End: 2025-01-17

## 2025-01-17 DIAGNOSIS — R39.12 BENIGN PROSTATIC HYPERPLASIA WITH WEAK URINARY STREAM: ICD-10-CM

## 2025-01-17 DIAGNOSIS — D69.6 DISORDER INVOLVING THROMBOCYTOPENIA (HCC): ICD-10-CM

## 2025-01-17 DIAGNOSIS — I10 ESSENTIAL HYPERTENSION: ICD-10-CM

## 2025-01-17 DIAGNOSIS — E03.9 ACQUIRED HYPOTHYROIDISM: ICD-10-CM

## 2025-01-17 DIAGNOSIS — I10 ESSENTIAL (PRIMARY) HYPERTENSION: ICD-10-CM

## 2025-01-17 DIAGNOSIS — E78.2 MIXED HYPERLIPIDEMIA: ICD-10-CM

## 2025-01-17 DIAGNOSIS — G62.9 NEUROPATHY: ICD-10-CM

## 2025-01-17 DIAGNOSIS — N40.1 BENIGN PROSTATIC HYPERPLASIA WITH WEAK URINARY STREAM: ICD-10-CM

## 2025-01-17 LAB
ALBUMIN SERPL-MCNC: 4.2 G/DL (ref 3.2–4.6)
ALBUMIN/GLOB SERPL: 1.8 (ref 1–1.9)
ALP SERPL-CCNC: 92 U/L (ref 40–129)
ALT SERPL-CCNC: 25 U/L (ref 8–55)
ANION GAP SERPL CALC-SCNC: 11 MMOL/L (ref 7–16)
APPEARANCE UR: CLEAR
AST SERPL-CCNC: 28 U/L (ref 15–37)
BILIRUB SERPL-MCNC: 0.3 MG/DL (ref 0–1.2)
BILIRUB UR QL: NEGATIVE
BUN SERPL-MCNC: 20 MG/DL (ref 8–23)
CALCIUM SERPL-MCNC: 9 MG/DL (ref 8.8–10.2)
CHLORIDE SERPL-SCNC: 104 MMOL/L (ref 98–107)
CHOLEST SERPL-MCNC: 110 MG/DL (ref 0–200)
CO2 SERPL-SCNC: 25 MMOL/L (ref 20–29)
COLOR UR: NORMAL
CREAT SERPL-MCNC: 1.11 MG/DL (ref 0.8–1.3)
ERYTHROCYTE [DISTWIDTH] IN BLOOD BY AUTOMATED COUNT: 13.6 % (ref 11.9–14.6)
GLOBULIN SER CALC-MCNC: 2.3 G/DL (ref 2.3–3.5)
GLUCOSE SERPL-MCNC: 104 MG/DL (ref 70–99)
GLUCOSE UR STRIP.AUTO-MCNC: NEGATIVE MG/DL
HCT VFR BLD AUTO: 45 % (ref 41.1–50.3)
HDLC SERPL-MCNC: 29 MG/DL (ref 40–60)
HDLC SERPL: 3.8 (ref 0–5)
HGB BLD-MCNC: 15.2 G/DL (ref 13.6–17.2)
HGB UR QL STRIP: NEGATIVE
KETONES UR QL STRIP.AUTO: NEGATIVE MG/DL
LDLC SERPL CALC-MCNC: 44 MG/DL (ref 0–100)
LEUKOCYTE ESTERASE UR QL STRIP.AUTO: NEGATIVE
MCH RBC QN AUTO: 29.5 PG (ref 26.1–32.9)
MCHC RBC AUTO-ENTMCNC: 33.8 G/DL (ref 31.4–35)
MCV RBC AUTO: 87.4 FL (ref 82–102)
NITRITE UR QL STRIP.AUTO: NEGATIVE
NRBC # BLD: 0 K/UL (ref 0–0.2)
PH UR STRIP: 5 (ref 5–9)
PLATELET # BLD AUTO: 133 K/UL (ref 150–450)
PMV BLD AUTO: 10.1 FL (ref 9.4–12.3)
POTASSIUM SERPL-SCNC: 4.4 MMOL/L (ref 3.5–5.1)
PROT SERPL-MCNC: 6.5 G/DL (ref 6.3–8.2)
PROT UR STRIP-MCNC: NEGATIVE MG/DL
RBC # BLD AUTO: 5.15 M/UL (ref 4.23–5.6)
SODIUM SERPL-SCNC: 140 MMOL/L (ref 136–145)
SP GR UR REFRACTOMETRY: 1.01 (ref 1–1.02)
TRIGL SERPL-MCNC: 188 MG/DL (ref 0–150)
TSH, 3RD GENERATION: 2.27 UIU/ML (ref 0.27–4.2)
UROBILINOGEN UR QL STRIP.AUTO: 0.2 EU/DL (ref 0.2–1)
VLDLC SERPL CALC-MCNC: 38 MG/DL (ref 6–23)
WBC # BLD AUTO: 4.6 K/UL (ref 4.3–11.1)

## 2025-01-20 SDOH — ECONOMIC STABILITY: FOOD INSECURITY: WITHIN THE PAST 12 MONTHS, YOU WORRIED THAT YOUR FOOD WOULD RUN OUT BEFORE YOU GOT MONEY TO BUY MORE.: NEVER TRUE

## 2025-01-20 SDOH — ECONOMIC STABILITY: FOOD INSECURITY: WITHIN THE PAST 12 MONTHS, THE FOOD YOU BOUGHT JUST DIDN'T LAST AND YOU DIDN'T HAVE MONEY TO GET MORE.: NEVER TRUE

## 2025-01-20 SDOH — ECONOMIC STABILITY: INCOME INSECURITY: IN THE LAST 12 MONTHS, WAS THERE A TIME WHEN YOU WERE NOT ABLE TO PAY THE MORTGAGE OR RENT ON TIME?: NO

## 2025-01-20 SDOH — ECONOMIC STABILITY: TRANSPORTATION INSECURITY
IN THE PAST 12 MONTHS, HAS THE LACK OF TRANSPORTATION KEPT YOU FROM MEDICAL APPOINTMENTS OR FROM GETTING MEDICATIONS?: NO

## 2025-01-20 ASSESSMENT — PATIENT HEALTH QUESTIONNAIRE - PHQ9
SUM OF ALL RESPONSES TO PHQ QUESTIONS 1-9: 1
SUM OF ALL RESPONSES TO PHQ9 QUESTIONS 1 & 2: 1
2. FEELING DOWN, DEPRESSED OR HOPELESS: NOT AT ALL
SUM OF ALL RESPONSES TO PHQ QUESTIONS 1-9: 1
1. LITTLE INTEREST OR PLEASURE IN DOING THINGS: SEVERAL DAYS
1. LITTLE INTEREST OR PLEASURE IN DOING THINGS: SEVERAL DAYS
SUM OF ALL RESPONSES TO PHQ QUESTIONS 1-9: 1
2. FEELING DOWN, DEPRESSED OR HOPELESS: NOT AT ALL
SUM OF ALL RESPONSES TO PHQ QUESTIONS 1-9: 1
SUM OF ALL RESPONSES TO PHQ9 QUESTIONS 1 & 2: 1

## 2025-01-23 ENCOUNTER — OFFICE VISIT (OUTPATIENT)
Dept: INTERNAL MEDICINE CLINIC | Facility: CLINIC | Age: 75
End: 2025-01-23

## 2025-01-23 VITALS
BODY MASS INDEX: 32.07 KG/M2 | DIASTOLIC BLOOD PRESSURE: 76 MMHG | OXYGEN SATURATION: 98 % | HEIGHT: 73 IN | SYSTOLIC BLOOD PRESSURE: 132 MMHG | WEIGHT: 242 LBS | TEMPERATURE: 98.6 F | HEART RATE: 61 BPM

## 2025-01-23 DIAGNOSIS — I10 ESSENTIAL (PRIMARY) HYPERTENSION: Primary | ICD-10-CM

## 2025-01-23 DIAGNOSIS — R16.1 SPLENOMEGALY: ICD-10-CM

## 2025-01-23 DIAGNOSIS — N40.1 BENIGN PROSTATIC HYPERPLASIA WITH WEAK URINARY STREAM: ICD-10-CM

## 2025-01-23 DIAGNOSIS — G60.0 CMT (CHARCOT-MARIE-TOOTH DISEASE): ICD-10-CM

## 2025-01-23 DIAGNOSIS — D80.9 IMMUNODEFICIENCY WITH PREDOMINANTLY ANTIBODY DEFECTS, UNSPECIFIED (HCC): ICD-10-CM

## 2025-01-23 DIAGNOSIS — E03.9 ACQUIRED HYPOTHYROIDISM: ICD-10-CM

## 2025-01-23 DIAGNOSIS — E78.2 MIXED HYPERLIPIDEMIA: ICD-10-CM

## 2025-01-23 DIAGNOSIS — R39.12 BENIGN PROSTATIC HYPERPLASIA WITH WEAK URINARY STREAM: ICD-10-CM

## 2025-01-23 DIAGNOSIS — M25.511 ACUTE PAIN OF RIGHT SHOULDER: ICD-10-CM

## 2025-01-23 DIAGNOSIS — F41.9 ANXIETY: ICD-10-CM

## 2025-01-23 DIAGNOSIS — N52.9 ERECTILE DYSFUNCTION, UNSPECIFIED ERECTILE DYSFUNCTION TYPE: ICD-10-CM

## 2025-01-23 DIAGNOSIS — G62.9 NEUROPATHY: ICD-10-CM

## 2025-01-23 DIAGNOSIS — D69.6 DISORDER INVOLVING THROMBOCYTOPENIA (HCC): ICD-10-CM

## 2025-01-23 RX ORDER — METHYLPREDNISOLONE ACETATE 40 MG/ML
40 INJECTION, SUSPENSION INTRA-ARTICULAR; INTRALESIONAL; INTRAMUSCULAR; SOFT TISSUE ONCE
Status: COMPLETED | OUTPATIENT
Start: 2025-01-23 | End: 2025-01-23

## 2025-01-23 RX ORDER — TADALAFIL 10 MG/1
10 TABLET ORAL PRN
Qty: 30 TABLET | Refills: 3 | Status: SHIPPED | OUTPATIENT
Start: 2025-01-23

## 2025-01-23 RX ORDER — ESCITALOPRAM OXALATE 5 MG/1
5 TABLET ORAL DAILY
Qty: 90 TABLET | Refills: 3 | Status: SHIPPED | OUTPATIENT
Start: 2025-01-23

## 2025-01-23 RX ORDER — GABAPENTIN 300 MG/1
300 CAPSULE ORAL DAILY
Qty: 30 CAPSULE | Refills: 5 | Status: SHIPPED | OUTPATIENT
Start: 2025-01-23 | End: 2025-07-22

## 2025-01-23 RX ORDER — ATORVASTATIN CALCIUM 10 MG/1
10 TABLET, FILM COATED ORAL DAILY
Qty: 90 TABLET | Refills: 3 | Status: SHIPPED | OUTPATIENT
Start: 2025-01-23

## 2025-01-23 RX ORDER — KETOROLAC TROMETHAMINE 30 MG/ML
30 INJECTION, SOLUTION INTRAMUSCULAR; INTRAVENOUS ONCE
Status: COMPLETED | OUTPATIENT
Start: 2025-01-23 | End: 2025-01-23

## 2025-01-23 RX ORDER — LEVOTHYROXINE SODIUM 50 UG/1
50 TABLET ORAL
Qty: 90 TABLET | Refills: 3 | Status: SHIPPED | OUTPATIENT
Start: 2025-01-23

## 2025-01-23 RX ADMIN — KETOROLAC TROMETHAMINE 30 MG: 30 INJECTION, SOLUTION INTRAMUSCULAR; INTRAVENOUS at 08:55

## 2025-01-23 RX ADMIN — METHYLPREDNISOLONE ACETATE 40 MG: 40 INJECTION, SUSPENSION INTRA-ARTICULAR; INTRALESIONAL; INTRAMUSCULAR; SOFT TISSUE at 08:56

## 2025-01-23 NOTE — ASSESSMENT & PLAN NOTE
Treatment regimen is effective.       Lab Results   Component Value Date/Time     01/17/2025 07:51 AM    K 4.4 01/17/2025 07:51 AM     01/17/2025 07:51 AM    CO2 25 01/17/2025 07:51 AM    BUN 20 01/17/2025 07:51 AM    CREATININE 1.11 01/17/2025 07:51 AM    GLUCOSE 104 01/17/2025 07:51 AM    CALCIUM 9.0 01/17/2025 07:51 AM    LABGLOM 70 01/17/2025 07:51 AM    LABGLOM >60 12/22/2023 08:57 AM      Hypertension Medications       ACE Inhibitors       lisinopril (PRINIVIL;ZESTRIL) 20 MG tablet Take 1 tablet by mouth daily

## 2025-01-23 NOTE — PATIENT INSTRUCTIONS
Follow up in six months for Annual Wellness Visit and routine labs prior to the visit.  See lab orders.     Right shoulder injection.    Refer to physical therapy.    Start Lexapro 5 mg daily for anxiety    SAURABH MCKEON MD

## 2025-01-23 NOTE — PROGRESS NOTES
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 He is not currently experiencing pain and has been consulting with a chiropractor, who has recommended supplements and thermal imaging. He is seeking advice on the appropriateness of these treatment options.    MEDICATIONS  Current: ibuprofen    Labs reviewed and discussed and medication refilled as needed for chronic medications during ov or adjusted based on lab results and/or our discussion as appropriate.  See discussion.    The patient's available records and electronic chart records are reviewed.  The PMH, PSH, medications, allergies, medications, FH, health maintenance and vaccination status are all reviewed and updated as appropriate.    Records from outside providers have been reviewed, summarized, and considered as noted in the history of present illness, past medical history, and objective data of this note and encounter.       Current Outpatient Medications:     atorvastatin (LIPITOR) 10 MG tablet, Take 1 tablet by mouth daily, Disp: 90 tablet, Rfl: 3    gabapentin (NEURONTIN) 300 MG capsule, Take 1 capsule by mouth daily for 180 days., Disp: 30 capsule, Rfl: 5    levothyroxine (SYNTHROID) 50 MCG tablet, Take 1 tablet by mouth every morning (before breakfast), Disp: 90 tablet, Rfl: 3    tadalafil (CIALIS) 10 MG tablet, Take 1 tablet by mouth as needed for Erectile Dysfunction, Disp: 30 tablet, Rfl: 3    escitalopram (LEXAPRO) 5 MG tablet, Take 1 tablet by mouth daily, Disp: 90 tablet, Rfl: 3    lisinopril (PRINIVIL;ZESTRIL) 20 MG tablet, Take 1 tablet by mouth daily, Disp: 90 tablet, Rfl: 3    Azelastine HCl 137 MCG/SPRAY SOLN, , Disp: , Rfl:     doxycycline hyclate (VIBRAMYCIN) 100 MG capsule, Take 1 capsule by mouth daily, Disp: , Rfl:     ketoconazole (NIZORAL) 2 % shampoo, , Disp: , Rfl:     montelukast (SINGULAIR) 10 MG tablet, , Disp: , Rfl:     Coenzyme Q10 (COQ10) 100 MG CAPS, 300 mg ,

## 2025-01-23 NOTE — ASSESSMENT & PLAN NOTE
Chronic issue.  CBC, Plts, CMP stable. Continue to clinically monitor.  Clinically asymptomatic.     Lab Results   Component Value Date    WBC 4.6 01/17/2025    HGB 15.2 01/17/2025    HCT 45.0 01/17/2025    MCV 87.4 01/17/2025     (L) 01/17/2025     Lab Results   Component Value Date     01/17/2025    K 4.4 01/17/2025     01/17/2025    CO2 25 01/17/2025    BUN 20 01/17/2025    CREATININE 1.11 01/17/2025    GLUCOSE 104 (H) 01/17/2025    CALCIUM 9.0 01/17/2025    BILITOT 0.3 01/17/2025    ALKPHOS 92 01/17/2025    AST 28 01/17/2025    ALT 25 01/17/2025    LABGLOM 70 01/17/2025    GLOB 2.3 01/17/2025

## 2025-01-23 NOTE — ASSESSMENT & PLAN NOTE
Well-controlled, continue current medications  Lab Results   Component Value Date    PSA 0.7 07/23/2024    PSA 0.9 07/03/2023       The natural history of prostate cancer and ongoing controversy regarding screening and potential treatment outcomes of prostate cancer has been discussed with the patient. The meaning of a false positive PSA and a false negative PSA has been discussed. He indicates understanding of the limitations of this screening test and wishes to proceed with screening PSA testing.

## 2025-01-23 NOTE — ASSESSMENT & PLAN NOTE
Lab Results   Component Value Date    TSH 2.270 01/17/2025     Treatment regimen is effective.   Continue Synthroid 50 mcg daily

## 2025-01-23 NOTE — ASSESSMENT & PLAN NOTE
Treatment regimen is effective.       Lab Results   Component Value Date    CHOL 110 01/17/2025    TRIG 188 (H) 01/17/2025    HDL 29 (L) 01/17/2025    LDL 44 01/17/2025    VLDL 38 (H) 01/17/2025    CHOLHDLRATIO 3.8 01/17/2025     Lab Results   Component Value Date    ALT 25 01/17/2025    AST 28 01/17/2025    ALKPHOS 92 01/17/2025    BILITOT 0.3 01/17/2025       Lipid Lowering Medications       HMG CoA Reductase Inhibitors       atorvastatin (LIPITOR) 10 MG tablet Take 1 tablet by mouth daily          Literature reviewed and considered in regards to LDL management and treatment with statin medications; considering current age, comorbid medical conditions; considering the risks vs benefits  Literature reviewed and considered in regards to LDL management and treatment with statin medications; considering current age, comorbid medical conditions; considering the risks vs benefits    Dayton RN, Ciarra ALMONTE, Dell OG, Nomi Zuniga A, Sebastian DG, Mecca CC. Treatment with Statins in Elderly Patients. [facility] ([facility]). 2019 Oct 30;55(11):721.    Jane K, Vic SH. Effects of Statins for Primary Prevention in the Elderly: Recent Evidence. J Lipid Atheroscler. 2020 Jan;9(1):1-7.    Niki MG, Abdferchoah A, Marly MB, Erica AM. Primary prevention statin therapy in older adults. Curr Opin Cardiol. 2023 Jan 1;38(1):11-20   The BECKY trial (Justification for the Use of Statins in Prevention: an Intervention Trial Evaluating Rosuvastatin) (N Engl J Med 2008; 359:4298-1833) is probably the best trial we that has shown benefit for primary prevention and end point data with the use of a statin.

## 2025-01-29 NOTE — PROGRESS NOTES
Guillermo TOLENTINO Itzel  : 1950  Primary: Medicare Part A And B (Medicare)  Secondary: AETNA SENIOR MEDICARE SUPP [facility] @ [address] [place] [address]  Phone: [phone]  Fax: [phone] Plan Frequency: 2-3x week    Plan of Care/Certification Expiration Date: 25        Plan of Care/Certification Expiration Date:  Plan of Care/Certification Expiration Date: 25    Frequency/Duration:   Plan Frequency: 2-3x week      Time In/Out:   Time In: 0912  Time Out: 1000      PT Visit Info:         Visit Count:  1    OUTPATIENT PHYSICAL THERAPY:   Treatment Note 2025       Episode  (R shoulder )               Treatment Diagnosis:    Right shoulder pain, unspecified chronicity  Pain in right arm  Medical/Referring Diagnosis:    Acute pain of right shoulder [M25.511]    Referring Physician:  Daniel Lyles MD MD Orders:  PT Eval and Treat   Return MD Appt:    Future Appointments   Date Time Provider Department Center   2025  7:45 AM MAT LAB MAT [facility]   2025 10:00 AM Daniel Lyles MD [facility]   10/17/2025 12:00 PM Fabián Escobar MD [facility]        Date of Onset:  No data recorded   Allergies:   Latex, Erythromycin, Amoxicillin-pot clavulanate, and Tetracycline  Restrictions/Precautions:   None      Interventions Planned (Treatment may consist of any combination of the following):     See Assessment Note    Subjective Comments:   I just want to know the right exercises to do.  I was having bad RTC pain that bothered me at night but now it is better.   Initial Pain Level:      /10  Post Session Pain Level:       /10  Medications Last Reviewed: 2025  Updated Objective Findings:  See Evaluation Note from today  Treatment   THERAPEUTIC EXERCISE: (24 minutes):    Exercises per grid below to improve mobility and strength.  Required minimal visual and verbal cues to promote proper body alignment and promote

## 2025-01-29 NOTE — PROGRESS NOTES
Guillermo Rochalinger  : 1950  Primary: Medicare Part A And B (Medicare)  Secondary: AETNA SENIOR MEDICARE SUPP [facility] @ [address] [place] [address]  Phone: [phone]  Fax: [phone] Plan Frequency: 2-3x week  Plan of Care/Certification Expiration Date: 25        Plan of Care/Certification Expiration Date:  Plan of Care/Certification Expiration Date: 25    Frequency/Duration: Plan Frequency: 2-3x week      Time In/Out:   Time In: 0912      PT Visit Info:         Visit Count:  1                OUTPATIENT PHYSICAL THERAPY:             Initial Assessment 2025               Episode (R shoulder )         Treatment Diagnosis:     Right shoulder pain, unspecified chronicity  Pain in right arm  Medical/Referring Diagnosis:    Acute pain of right shoulder [M25.511]    Referring Physician:  Daniel Lyles MD MD Orders:  PT Eval and Treat   Return MD Appt:    Future Appointments   Date Time Provider Department Center   2025  7:45 AM MAT LAB MAT [facility] DEP   2025 10:00 AM Daniel Lyles MD [facility]   10/17/2025 12:00 PM Fabián Escobar MD [facility]       Date of Onset:    2024  Allergies:  Latex, Erythromycin, Amoxicillin-pot clavulanate, and Tetracycline  Restrictions/Precautions:    None      Medications Last Reviewed: 2025     SUBJECTIVE   History of Injury/Illness (Reason for Referral):  \"Well, it just got sore.  It happened sometime around .  There isn't a restriction of movement but mainly hurts in bed at night.  I have had Chiro that didn't seem to help.  I had a steroid injection last Thursday - and it helped a little bit.  It's not terribly bad right now.      We put a lot of Mack decorations up outside and put a lot of stakes in the ground.  Maybe this was the culprit.    Patient Stated Goal(s):  \"I want to be able to have less pain. \"  Initial Pain Level:        1-2/10   Post Session Pain Level:     1 /10  Past Medical History/Comorbidities:   Mr. Robbins  has a past medical history of Neuropathy.  Mr. Robbins  has a past surgical history that includes Tonsillectomy (1970); sinus surgery (1998); and Blepharoplasty.  Social History/Living Environment:   Patient lives with their family    Prior Level of Function/Work/Activity:   Prior Level of Function: Mod Independent      Learning:   Does the patient/guardian have any barriers to learning?: No barriers    Fall Risk Scale:   Holley Total Score: 0    Personal Factors:        Sex:  male        Age:  74 y.o.     OBJECTIVE     Observation/Postural and Gait Assessment:  Fair posture in sitting and standing.  Mild FHP.    Palpation:  Proximal LH bicep posterior shoulder capsule.      ROM:   AROM in degrees Left Right   Shoulder flexion 180° 180   Shoulder abduction  180° 180   Shoulder internal rotation (IR)  (measured at 45 ° abduction) L2° L2   Shoulder external rotation (ER)  (measured 0° abduction) T1° T1°   ER (measured at 45° degrees of abduction)  ° °   Apley's scratch test (functional IR AROM)       Passive Accessory Motion: Normal     Strength:   Manual Muscle Test (out of 5) Left Right   Shoulder scaption 5 5   Shoulder ER 5 5   Shoulder IR 5 5   Teres Minor 5 5   Infraspinatus      (in pounds) with a CURLY hand-held dynamometer at position            Neurological Screen:  Dermatomes: Sensation testing through bilateral UE for light touch is normal .  Reflexes: Biceps (C5), brachioradialis (C6), and triceps (C7) are 2+ and 2+ .     Functional Mobility:  Was limited, but today feels great.  Outcome Measure:   RAISSA 43    ASSESSMENT   Initial Assessment:  Hx of R shoulder RTC strain that has since improved since shot last Thursday.  Shown strengthening exercises for home and reviewed thoroughly.   Therapy Problem List: (Impacting functional limitations):    Decreased Strength, Decreased ROM, Decreased Functional

## 2025-01-30 ENCOUNTER — HOSPITAL ENCOUNTER (OUTPATIENT)
Dept: PHYSICAL THERAPY | Age: 75
Setting detail: RECURRING SERIES
End: 2025-01-30
Payer: MEDICARE

## 2025-01-30 DIAGNOSIS — M79.601 PAIN IN RIGHT ARM: ICD-10-CM

## 2025-01-30 DIAGNOSIS — M25.511 RIGHT SHOULDER PAIN, UNSPECIFIED CHRONICITY: Primary | ICD-10-CM

## 2025-01-30 PROCEDURE — 97161 PT EVAL LOW COMPLEX 20 MIN: CPT

## 2025-01-30 PROCEDURE — 97110 THERAPEUTIC EXERCISES: CPT

## 2025-02-07 ENCOUNTER — APPOINTMENT (OUTPATIENT)
Dept: PHYSICAL THERAPY | Age: 75
End: 2025-02-07
Payer: MEDICARE

## 2025-02-12 ENCOUNTER — HOSPITAL ENCOUNTER (OUTPATIENT)
Dept: PHYSICAL THERAPY | Age: 75
Setting detail: RECURRING SERIES
Discharge: HOME | End: 2025-02-15
Payer: MEDICARE

## 2025-02-12 DIAGNOSIS — E03.9 ACQUIRED HYPOTHYROIDISM: ICD-10-CM

## 2025-02-12 PROCEDURE — 97140 MANUAL THERAPY 1/> REGIONS: CPT

## 2025-02-12 PROCEDURE — 97110 THERAPEUTIC EXERCISES: CPT

## 2025-02-12 RX ORDER — LEVOTHYROXINE SODIUM 50 UG/1
50 TABLET ORAL
Qty: 90 TABLET | Refills: 0 | OUTPATIENT
Start: 2025-02-12

## 2025-02-12 NOTE — PROGRESS NOTES
Guillermo Rochalinger  : 1950  Primary: Medicare Part A And B (Medicare)  Secondary: AETNA SENIOR MEDICARE SUPP [facility] @ [address] [place] [address]  Phone: [phone]  Fax: [phone] Plan Frequency: 2-3x week    Plan of Care/Certification Expiration Date: 25        Plan of Care/Certification Expiration Date:  Plan of Care/Certification Expiration Date: 25    Frequency/Duration:   Plan Frequency: 2-3x week      Time In/Out:   Time In: 0845  Time Out: 930      PT Visit Info:         Visit Count:  2    OUTPATIENT PHYSICAL THERAPY:   Treatment Note 2025       Episode  (R shoulder )               Treatment Diagnosis:    No data found  Medical/Referring Diagnosis:    No admission diagnoses are documented for this encounter.    Referring Physician:  Daniel Lyles MD MD Orders:  PT Eval and Treat   Return MD Appt:    Future Appointments   Date Time Provider Department Center   2025  8:00 AM Mayo Pan, PT [facility]   2025  8:45 AM Juli Kent, PT [facility]   2025  8:45 AM Juli Kent, PT [facility]   2025  7:45 AM MAT LAB MAT [facility] DEP   2025 10:00 AM Daniel Lyles MD [facility] DEP   10/17/2025 12:00 PM Fabián Escobar MD [facility] AMB        Date of Onset:  No data recorded   Allergies:   Latex, Erythromycin, Amoxicillin-pot clavulanate, and Tetracycline  Restrictions/Precautions:   None      Interventions Planned (Treatment may consist of any combination of the following):     See Assessment Note    Subjective Comments: Patient shoulder is feeling ok, but still some discomfort walking.    Initial Pain Level:     2 /10  Post Session Pain Level:      1 /10  Medications Last Reviewed: 2025  Updated Objective Findings:  None Today  Treatment   THERAPEUTIC EXERCISE: (35 minutes):    Exercises per grid below to improve mobility and strength.  Required minimal visual and

## 2025-02-14 ENCOUNTER — HOSPITAL ENCOUNTER (OUTPATIENT)
Dept: PHYSICAL THERAPY | Age: 75
Setting detail: RECURRING SERIES
Discharge: HOME | End: 2025-02-17
Payer: MEDICARE

## 2025-02-14 PROCEDURE — 97110 THERAPEUTIC EXERCISES: CPT

## 2025-02-14 PROCEDURE — 97140 MANUAL THERAPY 1/> REGIONS: CPT

## 2025-02-14 NOTE — PROGRESS NOTES
Guillermo Rochalinger  : 1950  Primary: Medicare Part A And B (Medicare)  Secondary: AETNA SENIOR MEDICARE SUPP [facility] @ [address] [place] [address]  Phone: [phone]  Fax: [phone] Plan Frequency: 2-3x week    Plan of Care/Certification Expiration Date: 25        Plan of Care/Certification Expiration Date:  Plan of Care/Certification Expiration Date: 25    Frequency/Duration:   Plan Frequency: 2-3x week      Time In/Out:   Time In: 805  Time Out: 845      PT Visit Info:         Visit Count:  3    OUTPATIENT PHYSICAL THERAPY:   Treatment Note 2025       Episode  (R shoulder )               Treatment Diagnosis:    No data found  Medical/Referring Diagnosis:    No admission diagnoses are documented for this encounter.    Referring Physician:  Daniel Lyles MD MD Orders:  PT Eval and Treat   Return MD Appt:    Future Appointments   Date Time Provider Department Center   2025  8:45 AM Juli Kent, PT [facility]   2025  8:45 AM Juli Kent, PT [facility]   2025  7:45 AM MAT LAB MAT [facility] DEP   2025 10:00 AM Daniel Lyles MD [facility] DEP   10/17/2025 12:00 PM Fabián Escobar MD [facility]        Date of Onset:  No data recorded   Allergies:   Latex, Erythromycin, Amoxicillin-pot clavulanate, and Tetracycline  Restrictions/Precautions:   None      Interventions Planned (Treatment may consist of any combination of the following):     See Assessment Note    Subjective Comments: Pt states he is feeling ok today, not much discomfort    Initial Pain Level:     10  Post Session Pain Level:      1 /10  Medications Last Reviewed: 2025  Updated Objective Findings:  None Today  Treatment   THERAPEUTIC EXERCISE: (28 minutes):    Exercises per grid below to improve mobility and strength.  Required minimal visual and verbal cues to promote proper body alignment and promote proper

## 2025-02-21 ENCOUNTER — HOSPITAL ENCOUNTER (OUTPATIENT)
Dept: PHYSICAL THERAPY | Age: 75
Setting detail: RECURRING SERIES
Discharge: HOME | End: 2025-02-24
Payer: MEDICARE

## 2025-02-21 PROCEDURE — 97110 THERAPEUTIC EXERCISES: CPT

## 2025-02-21 PROCEDURE — 97140 MANUAL THERAPY 1/> REGIONS: CPT

## 2025-02-21 NOTE — PROGRESS NOTES
Guillermo TOLENTINO Itzel  : 1950  Primary: Medicare Part A And B (Medicare)  Secondary: AETNA SENIOR MEDICARE SUPP [facility] @ [address] [place] [address]  Phone: [phone]  Fax: [phone] Plan Frequency: 2-3x week    Plan of Care/Certification Expiration Date: 25        Plan of Care/Certification Expiration Date:  Plan of Care/Certification Expiration Date: 25    Frequency/Duration:   Plan Frequency: 2-3x week      Time In/Out:   Time In: 845  Time Out: 925      PT Visit Info:         Visit Count:  4    OUTPATIENT PHYSICAL THERAPY:   Treatment Note 2025       Episode  (R shoulder )               Treatment Diagnosis:    No data found  Medical/Referring Diagnosis:    No admission diagnoses are documented for this encounter.    Referring Physician:  Daniel Lyles MD MD Orders:  PT Eval and Treat   Return MD Appt:    Future Appointments   Date Time Provider Department Center   2025  8:45 AM Juli Kent, PT [facility]   3/7/2025  8:00 AM Juli Kent, PT [facility]   3/14/2025  8:00 AM Juli Kent, PT [facility]   2025  7:45 AM MAT LAB MAT [facility] DEP   2025 10:00 AM Daniel Lyles MD [facility] DEP   10/17/2025 12:00 PM Fabián Escobar MD [facility] AMB        Date of Onset:  No data recorded   Allergies:   Latex, Erythromycin, Amoxicillin-pot clavulanate, and Tetracycline  Restrictions/Precautions:   None      Interventions Planned (Treatment may consist of any combination of the following):     See Assessment Note    Subjective Comments: Pt states he is feeling ok today, not much discomfort    Initial Pain Level:     2 /10  Post Session Pain Level:      1 /10  Medications Last Reviewed: 2025  Updated Objective Findings:  None Today  Treatment   THERAPEUTIC EXERCISE: (30 minutes):    Exercises per grid below to improve mobility and strength.  Required minimal visual and verbal cues

## 2025-02-21 NOTE — PROGRESS NOTES
uGillermo Rochalinger  : 1950  Primary: Medicare Part A And B (Medicare)  Secondary: AETNA SENIOR MEDICARE SUPP [facility] @ [address] [place] [address]  Phone: [phone]  Fax: [phone] Plan Frequency: 2-3x week    Plan of Care/Certification Expiration Date: 25        Plan of Care/Certification Expiration Date:  Plan of Care/Certification Expiration Date: 25    Frequency/Duration:   Plan Frequency: 2-3x week      Time In/Out:   Time In: 45  Time Out: 925      PT Visit Info:         Visit Count:  4    OUTPATIENT PHYSICAL THERAPY:   Treatment Note 2025       Episode  (R shoulder )               Treatment Diagnosis:    No data found  Medical/Referring Diagnosis:    No admission diagnoses are documented for this encounter.    Referring Physician:  Daniel Lyles MD MD Orders:  PT Eval and Treat   Return MD Appt:    Future Appointments   Date Time Provider Department Center   2025  8:45 AM Juli Kent, PT [facility]   2025  7:45 AM MAT LAB MAT [facility] DEP   2025 10:00 AM Daniel Lyles MD [facility] DEP   10/17/2025 12:00 PM Fabián Escobar MD [facility]        Date of Onset:  No data recorded   Allergies:   Latex, Erythromycin, Amoxicillin-pot clavulanate, and Tetracycline  Restrictions/Precautions:   None      Interventions Planned (Treatment may consist of any combination of the following):     See Assessment Note    Subjective Comments: I think it's getting better.  I can go longer without pain.  I don't have much pain right now.  Most the pain occurs at night.      Initial Pain Level:     1 /10  Post Session Pain Level:       /10  Medications Last Reviewed: 2025  Updated Objective Findings:  None Today  Treatment   THERAPEUTIC EXERCISE: (29 minutes):    Exercises per grid below to improve mobility and strength.  Required minimal visual and verbal cues to promote proper body alignment

## 2025-02-28 ENCOUNTER — HOSPITAL ENCOUNTER (OUTPATIENT)
Dept: PHYSICAL THERAPY | Age: 75
Setting detail: RECURRING SERIES
End: 2025-02-28
Payer: MEDICARE

## 2025-02-28 PROCEDURE — 97110 THERAPEUTIC EXERCISES: CPT

## 2025-02-28 PROCEDURE — 97140 MANUAL THERAPY 1/> REGIONS: CPT

## 2025-02-28 NOTE — PROGRESS NOTES
Guillermo Rochalinger  : 1950  Primary: Medicare Part A And B (Medicare)  Secondary: AETNA SENIOR MEDICARE SUPP [facility] @ [address] [place] [address]  Phone: [phone]  Fax: [phone] Plan Frequency: 2-3x week    Plan of Care/Certification Expiration Date: 25        Plan of Care/Certification Expiration Date:  Plan of Care/Certification Expiration Date: 25    Frequency/Duration:   Plan Frequency: 2-3x week      Time In/Out:   Time In: 0840  Time Out: 925      PT Visit Info:         Visit Count:  5    OUTPATIENT PHYSICAL THERAPY:   Treatment Note 2025       Episode  (R shoulder )               Treatment Diagnosis:    No data found  Medical/Referring Diagnosis:    No admission diagnoses are documented for this encounter.    Referring Physician:  Daniel Lyles MD MD Orders:  PT Eval and Treat   Return MD Appt:    Future Appointments   Date Time Provider Department Center   3/7/2025  8:00 AM Juli Kent, PT [facility]   3/14/2025  8:00 AM Juli Kent, PT [facility]   2025  7:45 AM MAT LAB MAT [facility] DEP   2025 10:00 AM Daniel Lyles MD [facility] DEP   10/17/2025 12:00 PM Fabián Escobar MD [facility] AMB        Date of Onset:  No data recorded   Allergies:   Latex, Erythromycin, Amoxicillin-pot clavulanate, and Tetracycline  Restrictions/Precautions:   None      Interventions Planned (Treatment may consist of any combination of the following):     See Assessment Note    Subjective Comments: I think it's getting better. When I do this (demonstrates high pull motion) it hurts.      Initial Pain Level:     1 /10  Post Session Pain Level:      10  Medications Last Reviewed: 2025  Updated Objective Findings:  None Today  Treatment   THERAPEUTIC EXERCISE: (29 minutes):    Exercises per grid below to improve mobility and strength.  Required minimal visual and verbal cues to promote proper

## 2025-03-07 ENCOUNTER — HOSPITAL ENCOUNTER (OUTPATIENT)
Dept: PHYSICAL THERAPY | Age: 75
Setting detail: RECURRING SERIES
Discharge: HOME | End: 2025-03-10
Payer: MEDICARE

## 2025-03-07 PROCEDURE — 97110 THERAPEUTIC EXERCISES: CPT

## 2025-03-07 PROCEDURE — 97140 MANUAL THERAPY 1/> REGIONS: CPT

## 2025-03-07 NOTE — PROGRESS NOTES
Guillermo Rochalinger  : 1950  Primary: Medicare Part A And B (Medicare)  Secondary: AETNA SENIOR MEDICARE SUPP [facility] @ [address] [place] [address]  Phone: [phone]  Fax: [phone] Plan Frequency: 2-3x week    Plan of Care/Certification Expiration Date: 25        Plan of Care/Certification Expiration Date:  Plan of Care/Certification Expiration Date: 25    Frequency/Duration:   Plan Frequency: 2-3x week      Time In/Out:   Time In: 0758  Time Out: 0842      PT Visit Info:         Visit Count:  6    OUTPATIENT PHYSICAL THERAPY:   Treatment Note 3/7/2025       Episode  (R shoulder )               Treatment Diagnosis:    No data found  Medical/Referring Diagnosis:    No admission diagnoses are documented for this encounter.    Referring Physician:  Daniel Lyles MD MD Orders:  PT Eval and Treat   Return MD Appt:    Future Appointments   Date Time Provider Department Center   3/14/2025  8:00 AM Juli Kent, PT [facility]   2025  7:45 AM MAT LAB MAT [facility] DEP   2025 10:00 AM Daniel Lyles MD [facility] DEP   10/17/2025 12:00 PM Fabián Escobar MD [facility]B        Date of Onset:  No data recorded   Allergies:   Latex, Erythromycin, Amoxicillin-pot clavulanate, and Tetracycline  Restrictions/Precautions:   None      Interventions Planned (Treatment may consist of any combination of the following):     See Assessment Note    Subjective Comments: It's coming along.  I've been doing my exercises.    Initial Pain Level:     1 /10  Post Session Pain Level:      1 /10  Medications Last Reviewed: 3/7/2025  Updated Objective Findings:  None Today  Treatment   THERAPEUTIC EXERCISE: (30 minutes):    Exercises per grid below to improve mobility and strength.  Required minimal visual and verbal cues to promote proper body alignment and promote proper body posture.  Progressed resistance and range as

## 2025-03-14 ENCOUNTER — HOSPITAL ENCOUNTER (OUTPATIENT)
Dept: PHYSICAL THERAPY | Age: 75
Setting detail: RECURRING SERIES
Discharge: HOME | End: 2025-03-17
Payer: MEDICARE

## 2025-03-14 PROCEDURE — 97110 THERAPEUTIC EXERCISES: CPT

## 2025-03-14 PROCEDURE — 97140 MANUAL THERAPY 1/> REGIONS: CPT

## 2025-03-14 NOTE — PROGRESS NOTES
Guillermo Rochalinger  : 1950  Primary: Medicare Part A And B (Medicare)  Secondary: [employer] SENIOR MEDICARE SUPP [facility] @ [address] [place] [address]  Phone: [phone]  Fax: [phone] Plan Frequency: 2-3x week    Plan of Care/Certification Expiration Date: 25        Plan of Care/Certification Expiration Date:  Plan of Care/Certification Expiration Date: 25    Frequency/Duration:   Plan Frequency: 2-3x week      Time In/Out:   Time In: 0755  Time Out: 0840      PT Visit Info:         Visit Count:  7    OUTPATIENT PHYSICAL THERAPY:   Treatment Note 3/14/2025       Episode  (R shoulder )               Treatment Diagnosis:    No data found  Medical/Referring Diagnosis:    No admission diagnoses are documented for this encounter.    Referring Physician:  Daniel Lyles MD MD Orders:  PT Eval and Treat   Return MD Appt:    Future Appointments   Date Time Provider Department Center   2025  7:45 AM MAT LAB MAT [facility] DEP   2025 10:00 AM Daniel Lyles MD [facility]   10/17/2025 12:00 PM Fabián Escobar MD [facility] AMB        Date of Onset:  No data recorded   Allergies:   Latex, Erythromycin, Amoxicillin-pot clavulanate, and Tetracycline  Restrictions/Precautions:   None      Interventions Planned (Treatment may consist of any combination of the following):     See Assessment Note    Subjective Comments: I planted some jelena bushes and that did give me pain.      Initial Pain Level:     1 /10  Post Session Pain Level:      1 /10  Medications Last Reviewed: 3/14/2025  Updated Objective Findings:  None Today  Treatment   THERAPEUTIC EXERCISE: (30 minutes):    Exercises per grid below to improve mobility and strength.  Required minimal visual and verbal cues to promote proper body alignment and promote proper body posture.  Progressed resistance and range as indicated.     Date:  25 Date:  25 Date:

## 2025-04-28 ENCOUNTER — TELEMEDICINE (OUTPATIENT)
Dept: INTERNAL MEDICINE CLINIC | Facility: CLINIC | Age: 75
End: 2025-04-28
Payer: MEDICARE

## 2025-04-28 DIAGNOSIS — U07.1 COVID-19: Primary | ICD-10-CM

## 2025-04-28 PROCEDURE — 99213 OFFICE O/P EST LOW 20 MIN: CPT | Performed by: NURSE PRACTITIONER

## 2025-04-28 ASSESSMENT — ENCOUNTER SYMPTOMS
COUGH: 0
RHINORRHEA: 1
DIARRHEA: 0
SORE THROAT: 0
VOMITING: 0
ABDOMINAL PAIN: 0
SHORTNESS OF BREATH: 0
WHEEZING: 0
NAUSEA: 0

## 2025-04-28 NOTE — PROGRESS NOTES
Guillermo Robbins, was evaluated through a synchronous (real-time) audio-video encounter. The patient (or guardian if applicable) is aware that this is a billable service, which includes applicable co-pays. This Virtual Visit was conducted with patient's (and/or legal guardian's) consent. Patient identification was verified, and a caregiver was present when appropriate.   The patient was located at Home: [address] [place] [id number]  Provider was located at Facility (Appt Dept): [address] [place] [id number]  Confirm you are appropriately licensed, registered, or certified to deliver care in the state where the patient is located as indicated above. If you are not or unsure, please re-schedule the visit: No, this visit will be rescheduled.     Guillermo Robbins (:  1950) is a Established patient, presenting virtually for evaluation of the following:      Below is the assessment and plan developed based on review of pertinent history, physical exam, labs, studies, and medications.     Assessment & Plan  COVID-19    Patient completed Paxlovid. Symptoms improving. Well-appearing on video visit today. Continue to monitor. Call if symptoms worsen or fail to continue to improve.            Return for NEXT SCHEDULED ROUTINE FOLLOW-UP; SOONER IF NEEDED.       Subjective   HPI  Patient seen virtually today with complaint of cough and congestion. Symptoms started 25. Associated symptoms include low-grade fever, chills, fatigue, postnasal drainage, rhinorrhea. He denies any SOB or wheezing. Tested positive for covid via an at-home rapid test on 25. He was seen at urgent care later that day. Repeat test confirmed covid diagnosis. Treated with Paxlovid which he completed today. Seen today for follow-up. Complaint today of persistent fatigue, congestion, postnasal drainage and intermittent chills. Reports that he spikes low-grade fever most evenings (less than 100 F).

## 2025-04-30 ENCOUNTER — OFFICE VISIT (OUTPATIENT)
Dept: ORTHOPEDIC SURGERY | Age: 75
End: 2025-04-30

## 2025-04-30 DIAGNOSIS — M67.911 TENDINOPATHY OF RIGHT ROTATOR CUFF: ICD-10-CM

## 2025-04-30 DIAGNOSIS — M25.511 RIGHT SHOULDER PAIN, UNSPECIFIED CHRONICITY: ICD-10-CM

## 2025-04-30 DIAGNOSIS — M19.011 ARTHRITIS OF RIGHT SHOULDER: Primary | ICD-10-CM

## 2025-04-30 RX ORDER — MELOXICAM 15 MG/1
15 TABLET ORAL DAILY
Qty: 30 TABLET | Refills: 1 | Status: SHIPPED | OUTPATIENT
Start: 2025-04-30

## 2025-04-30 NOTE — PROGRESS NOTES
Name: Guillermo Robbins  YOB: 1950  Gender: male  MRN: [id number]  Date of Encounter:  4/30/2025         CC  No chief complaint on file.      HPI:    History of Present Illness  The patient is a 74-year-old male presenting with right shoulder pain.    He has been experiencing persistent right shoulder pain since Thanksgiving, which he attributes to an incident involving the use of a hammer for support while decorating outdoors. The pain is intermittent, with periods of exacerbation and relief. He reports no associated numbness or tingling in the arm but occasionally experiences referred pain. He also reports the presence of a knot on the side of his neck, which he manages with massage. He is right-handed and reports no significant pain during the day. He does not have diabetes and is not on any anticoagulant therapy. He reports no recent infections, except for a COVID-19 infection last week, from which he has recovered. He has been afebrile for 2 days. The pain is particularly bothersome at night and when lying on his left side, but he finds relief when changing position to lie on his back. He does not believe the pain is significantly impacting his quality of life but acknowledges that it is a source of irritation. Despite seeking chiropractic care from two different practitioners and undergoing physical therapy, he has not experienced any significant relief. His treatment regimen includes ibuprofen, administered twice daily, which he tolerates well without any gastrointestinal or renal complications. He received a single injection in January 2025, which did not provide substantial relief. He has also tried red light therapy, which did not exacerbate his symptoms. His home exercise program includes exercises for the back, front and back deltoid, and bent-over rows.    Supplemental Information  He has chronic sinus issues but is not currently on antibiotics. He takes doxycycline for rosacea. He has

## 2025-05-02 RX ORDER — CEFDINIR 300 MG/1
300 CAPSULE ORAL 2 TIMES DAILY
Qty: 14 CAPSULE | Refills: 0 | Status: SHIPPED | OUTPATIENT
Start: 2025-05-02 | End: 2025-05-09

## 2025-05-30 ENCOUNTER — APPOINTMENT (OUTPATIENT)
Dept: ULTRASOUND IMAGING | Age: 75
End: 2025-05-30
Attending: INTERNAL MEDICINE
Payer: MEDICARE

## 2025-05-30 ENCOUNTER — HOSPITAL ENCOUNTER (OUTPATIENT)
Dept: ULTRASOUND IMAGING | Age: 75
Discharge: HOME | End: 2025-05-30
Attending: INTERNAL MEDICINE
Payer: MEDICARE

## 2025-05-30 ENCOUNTER — RESULTS FOLLOW-UP (OUTPATIENT)
Dept: INTERNAL MEDICINE CLINIC | Facility: CLINIC | Age: 75
End: 2025-05-30

## 2025-05-30 ENCOUNTER — OFFICE VISIT (OUTPATIENT)
Dept: INTERNAL MEDICINE CLINIC | Facility: CLINIC | Age: 75
End: 2025-05-30

## 2025-05-30 VITALS
HEART RATE: 62 BPM | HEIGHT: 72 IN | TEMPERATURE: 98.1 F | BODY MASS INDEX: 32.89 KG/M2 | SYSTOLIC BLOOD PRESSURE: 134 MMHG | OXYGEN SATURATION: 96 % | WEIGHT: 242.8 LBS | DIASTOLIC BLOOD PRESSURE: 62 MMHG

## 2025-05-30 DIAGNOSIS — D80.9 IMMUNODEFICIENCY WITH PREDOMINANTLY ANTIBODY DEFECTS, UNSPECIFIED: ICD-10-CM

## 2025-05-30 DIAGNOSIS — R60.0 LEG EDEMA: ICD-10-CM

## 2025-05-30 DIAGNOSIS — D69.6 DISORDER INVOLVING THROMBOCYTOPENIA: ICD-10-CM

## 2025-05-30 DIAGNOSIS — G62.9 NEUROPATHY: ICD-10-CM

## 2025-05-30 DIAGNOSIS — R60.0 LEG EDEMA: Primary | ICD-10-CM

## 2025-05-30 PROCEDURE — 93971 EXTREMITY STUDY: CPT

## 2025-05-30 PROCEDURE — 93971 EXTREMITY STUDY: CPT | Performed by: RADIOLOGY

## 2025-05-30 RX ORDER — FLUTICASONE PROPIONATE 0.05 %
CREAM (GRAM) TOPICAL
COMMUNITY
Start: 2025-05-22

## 2025-05-30 RX ORDER — TRIAMCINOLONE ACETONIDE 1 MG/G
CREAM TOPICAL 2 TIMES DAILY
COMMUNITY
Start: 2025-05-22

## 2025-05-30 RX ORDER — GABAPENTIN 300 MG/1
300 CAPSULE ORAL DAILY
Qty: 90 CAPSULE | Refills: 0 | Status: SHIPPED | OUTPATIENT
Start: 2025-05-30 | End: 2025-11-26

## 2025-05-30 ASSESSMENT — ENCOUNTER SYMPTOMS
RHINORRHEA: 0
COUGH: 0
SHORTNESS OF BREATH: 0
BLOOD IN STOOL: 0
ABDOMINAL PAIN: 0

## 2025-05-30 NOTE — PROGRESS NOTES
escitalopram (LEXAPRO) 5 MG tablet, Take 1 tablet by mouth daily, Disp: 90 tablet, Rfl: 3    lisinopril (PRINIVIL;ZESTRIL) 20 MG tablet, Take 1 tablet by mouth daily, Disp: 90 tablet, Rfl: 3    Azelastine HCl 137 MCG/SPRAY SOLN, , Disp: , Rfl:     doxycycline hyclate (VIBRAMYCIN) 100 MG capsule, Take 1 capsule by mouth daily, Disp: , Rfl:     ketoconazole (NIZORAL) 2 % shampoo, , Disp: , Rfl:     montelukast (SINGULAIR) 10 MG tablet, , Disp: , Rfl:     Coenzyme Q10 (COQ10) 100 MG CAPS, 300 mg , Disp: , Rfl:     Cholecalciferol 50 MCG (2000 UT) CAPS, Take 2 capsules by mouth daily, Disp: , Rfl:     mupirocin (BACTROBAN) 2 % nasal ointment, by Nasal route 2 times daily Take by Nasal route 2 times daily., Disp: , Rfl:     meloxicam (MOBIC) 15 MG tablet, Take 1 tablet by mouth daily (Patient not taking: Reported on 5/30/2025), Disp: 30 tablet, Rfl: 1    Allergies   Allergen Reactions    Latex      Other reaction(s): Rash-Allergy    Erythromycin Diarrhea       Review of Systems   Constitutional:  Negative for chills, fatigue and fever.   HENT:  Negative for congestion, postnasal drip and rhinorrhea.    Eyes:  Negative for visual disturbance.   Respiratory:  Negative for cough and shortness of breath.    Cardiovascular:  Positive for leg swelling. Negative for chest pain and palpitations.   Gastrointestinal:  Negative for abdominal pain and blood in stool.   Genitourinary:  Negative for dysuria and frequency.   Musculoskeletal:  Negative for arthralgias and myalgias.   Skin: Negative.    Neurological:  Negative for numbness and headaches.   Psychiatric/Behavioral:  Negative for dysphoric mood and sleep disturbance. The patient is not nervous/anxious.    All other systems reviewed and are negative.      Objective:  /62 (BP Site: Right Upper Arm, Patient Position: Sitting)   Pulse 62   Temp 98.1 °F (36.7 °C) (Temporal)   Ht 1.829 m (6')   Wt 110.1 kg (242 lb 12.8 oz)   SpO2 96%   BMI 32.93 kg/m²

## 2025-06-04 ENCOUNTER — OFFICE VISIT (OUTPATIENT)
Dept: ORTHOPEDIC SURGERY | Age: 75
End: 2025-06-04

## 2025-06-04 DIAGNOSIS — M19.011 ARTHRITIS OF RIGHT SHOULDER: Primary | ICD-10-CM

## 2025-06-04 RX ORDER — TRIAMCINOLONE ACETONIDE 40 MG/ML
40 INJECTION, SUSPENSION INTRA-ARTICULAR; INTRAMUSCULAR ONCE
Status: COMPLETED | OUTPATIENT
Start: 2025-06-04 | End: 2025-06-04

## 2025-06-04 RX ADMIN — TRIAMCINOLONE ACETONIDE 40 MG: 40 INJECTION, SUSPENSION INTRA-ARTICULAR; INTRAMUSCULAR at 09:06

## 2025-06-04 NOTE — PROGRESS NOTES
RIGHT Glenohumeral Joint Injection - Ultrasound Guided     An injection of corticosteroid was discussed today and is indicated for patient’s pain and condition today.  A time out was completed prior to proceeding. Site of injection, procedure, and all team members were identified. Risks were discussed. Patient verbally consented to procedure. Risks explained include infection, bleeding, nerve damage, steroid flare, elevation in blood glucose and pain at the site of injection were discussed at length with the patient.     The patient was positioned lying on their side. A posterior lateral approach was utilized for this injection procedure. The site of the injection was cleansed iodine solution. site of the injection was then cleansed chlorhexidine. A sterile gel was then placed over the area and the ultrasound probe was used to positioned to reveal the glenohumeral joint. Following this a vapo coolant spray was utilized to provide local skin anesthesia. A solution of  40mg/ml Kenalog, 1cc and 4cc 1% lidocaine was delivered through a 25 gauge, 2\" spinal into the intraarticular space. The site was again cleansed with an alcohol swab. Ultrasound was used to ensure adequate deposition of the injectate solution into the correct location. The patient tolerated this procedure well with no adverse events. There was some notable pain relief reported by the patient prior to leaving the office today. The patient was counseled not to submerge the site for 24 hours, not to perform strenuous activity for the next five days, and if notes any signs or symptoms consistent with joint infection or allergic reaction to go to a local emergency room. The patient was observed for 15 minutes postprocedure and was allowed to be discharged from clinic in their usual state of health. Imaging was saved to PACS system.      Frank Montgomery MD, [facility]  Orthopedics and Sports Medicine  [facility]

## 2025-06-11 ENCOUNTER — HOSPITAL ENCOUNTER (OUTPATIENT)
Dept: ULTRASOUND IMAGING | Age: 75
Discharge: HOME | End: 2025-06-14
Attending: INTERNAL MEDICINE
Payer: MEDICARE

## 2025-06-11 DIAGNOSIS — R60.0 LEG EDEMA: ICD-10-CM

## 2025-06-11 LAB
VAS LEFT ABI: 1.33
VAS LEFT ARM BP: 133 MMHG
VAS LEFT DORSALIS PEDIS BP: 173 MMHG
VAS LEFT PTA BP: 177 MMHG
VAS RIGHT ABI: 1.32
VAS RIGHT ARM BP: 131 MMHG
VAS RIGHT DORSALIS PEDIS BP: 165 MMHG
VAS RIGHT PTA BP: 176 MMHG

## 2025-06-11 PROCEDURE — 93926 LOWER EXTREMITY STUDY: CPT

## 2025-06-13 NOTE — TELEPHONE ENCOUNTER
Vascular study was normal, no hemodynamic significant stenosis seen in L leg; symmetric brachial pressures noted bilaterally; study was normal, but can schedule f/u visit to discuss this further if he has additional questions or concerns; looks like he has appointment next month with Dr. Lyles, but can schedule earlier appointment if needed; I will also forward study results to Dr. Lyles so he is aware.

## 2025-07-18 ENCOUNTER — LAB (OUTPATIENT)
Dept: INTERNAL MEDICINE CLINIC | Facility: CLINIC | Age: 75
End: 2025-07-18

## 2025-07-18 DIAGNOSIS — N40.1 BENIGN PROSTATIC HYPERPLASIA WITH WEAK URINARY STREAM: ICD-10-CM

## 2025-07-18 DIAGNOSIS — E03.9 ACQUIRED HYPOTHYROIDISM: ICD-10-CM

## 2025-07-18 DIAGNOSIS — R16.1 SPLENOMEGALY: ICD-10-CM

## 2025-07-18 DIAGNOSIS — G62.9 NEUROPATHY: ICD-10-CM

## 2025-07-18 DIAGNOSIS — R39.12 BENIGN PROSTATIC HYPERPLASIA WITH WEAK URINARY STREAM: ICD-10-CM

## 2025-07-18 DIAGNOSIS — D80.9 IMMUNODEFICIENCY WITH PREDOMINANTLY ANTIBODY DEFECTS, UNSPECIFIED: ICD-10-CM

## 2025-07-18 DIAGNOSIS — I10 ESSENTIAL (PRIMARY) HYPERTENSION: ICD-10-CM

## 2025-07-18 DIAGNOSIS — D69.6 DISORDER INVOLVING THROMBOCYTOPENIA: ICD-10-CM

## 2025-07-18 DIAGNOSIS — E78.2 MIXED HYPERLIPIDEMIA: ICD-10-CM

## 2025-07-18 DIAGNOSIS — N52.9 ERECTILE DYSFUNCTION, UNSPECIFIED ERECTILE DYSFUNCTION TYPE: ICD-10-CM

## 2025-07-18 LAB
ALT SERPL-CCNC: 40 U/L (ref 8–55)
ANION GAP SERPL CALC-SCNC: 12 MMOL/L (ref 7–16)
APPEARANCE UR: CLEAR
AST SERPL-CCNC: 31 U/L (ref 15–37)
BACTERIA URNS QL MICRO: NEGATIVE /HPF
BASOPHILS # BLD: 0.05 K/UL (ref 0–0.2)
BASOPHILS NFR BLD: 0.7 % (ref 0–2)
BILIRUB UR QL: NEGATIVE
BUN SERPL-MCNC: 20 MG/DL (ref 8–23)
CALCIUM SERPL-MCNC: 9.4 MG/DL (ref 8.8–10.2)
CASTS URNS QL MICRO: 0 /LPF
CHLORIDE SERPL-SCNC: 102 MMOL/L (ref 98–107)
CHOLEST SERPL-MCNC: 116 MG/DL (ref 0–200)
CO2 SERPL-SCNC: 25 MMOL/L (ref 20–29)
COLOR UR: NORMAL
CREAT SERPL-MCNC: 1.14 MG/DL (ref 0.8–1.3)
CRYSTALS URNS QL MICRO: 0 /LPF
DIFFERENTIAL METHOD BLD: ABNORMAL
EOSINOPHIL # BLD: 0.06 K/UL (ref 0–0.8)
EOSINOPHIL NFR BLD: 0.9 % (ref 0.5–7.8)
EPI CELLS #/AREA URNS HPF: NORMAL /HPF (ref 0–5)
ERYTHROCYTE [DISTWIDTH] IN BLOOD BY AUTOMATED COUNT: 14.2 % (ref 11.9–14.6)
GLUCOSE SERPL-MCNC: 108 MG/DL (ref 70–99)
GLUCOSE UR STRIP.AUTO-MCNC: NEGATIVE MG/DL
HCT VFR BLD AUTO: 44.1 % (ref 41.1–50.3)
HDLC SERPL-MCNC: 36 MG/DL (ref 40–60)
HDLC SERPL: 3.2 (ref 0–5)
HGB BLD-MCNC: 15.3 G/DL (ref 13.6–17.2)
HGB UR QL STRIP: NEGATIVE
HYALINE CASTS URNS QL MICRO: NORMAL /LPF
IMM GRANULOCYTES # BLD AUTO: 0.08 K/UL (ref 0–0.5)
IMM GRANULOCYTES NFR BLD AUTO: 1.1 % (ref 0–5)
KETONES UR QL STRIP.AUTO: NEGATIVE MG/DL
LDLC SERPL CALC-MCNC: 53 MG/DL (ref 0–100)
LEUKOCYTE ESTERASE UR QL STRIP.AUTO: NEGATIVE
LYMPHOCYTES # BLD: 1.73 K/UL (ref 0.5–4.6)
LYMPHOCYTES NFR BLD: 24.6 % (ref 13–44)
MCH RBC QN AUTO: 30.8 PG (ref 26.1–32.9)
MCHC RBC AUTO-ENTMCNC: 34.7 G/DL (ref 31.4–35)
MCV RBC AUTO: 88.9 FL (ref 82–102)
MONOCYTES # BLD: 0.64 K/UL (ref 0.1–1.3)
MONOCYTES NFR BLD: 9.1 % (ref 4–12)
MUCOUS THREADS URNS QL MICRO: 0 /LPF
NEUTS SEG # BLD: 4.46 K/UL (ref 1.7–8.2)
NEUTS SEG NFR BLD: 63.6 % (ref 43–78)
NITRITE UR QL STRIP.AUTO: NEGATIVE
NRBC # BLD: 0 K/UL (ref 0–0.2)
PH UR STRIP: 5 (ref 5–9)
PLATELET # BLD AUTO: 149 K/UL (ref 150–450)
PMV BLD AUTO: 10.5 FL (ref 9.4–12.3)
POTASSIUM SERPL-SCNC: 4.6 MMOL/L (ref 3.5–5.1)
PROT UR STRIP-MCNC: NEGATIVE MG/DL
PSA SERPL-MCNC: 0.7 NG/ML (ref 0–4)
RBC # BLD AUTO: 4.96 M/UL (ref 4.23–5.6)
RBC #/AREA URNS HPF: NORMAL /HPF (ref 0–5)
SODIUM SERPL-SCNC: 139 MMOL/L (ref 136–145)
SP GR UR REFRACTOMETRY: 1.01 (ref 1–1.02)
TRIGL SERPL-MCNC: 135 MG/DL (ref 0–150)
TSH, 3RD GENERATION: 2.76 UIU/ML (ref 0.27–4.2)
URINE CULTURE IF INDICATED: NORMAL
UROBILINOGEN UR QL STRIP.AUTO: 0.2 EU/DL (ref 0.2–1)
VLDLC SERPL CALC-MCNC: 27 MG/DL (ref 6–23)
WBC # BLD AUTO: 7 K/UL (ref 4.3–11.1)
WBC URNS QL MICRO: NORMAL /HPF (ref 0–4)

## 2025-07-18 SDOH — HEALTH STABILITY: PHYSICAL HEALTH: ON AVERAGE, HOW MANY MINUTES DO YOU ENGAGE IN EXERCISE AT THIS LEVEL?: 30 MIN

## 2025-07-18 SDOH — HEALTH STABILITY: PHYSICAL HEALTH: ON AVERAGE, HOW MANY DAYS PER WEEK DO YOU ENGAGE IN MODERATE TO STRENUOUS EXERCISE (LIKE A BRISK WALK)?: 4 DAYS

## 2025-07-18 ASSESSMENT — LIFESTYLE VARIABLES
HOW OFTEN DO YOU HAVE A DRINK CONTAINING ALCOHOL: 1
HOW MANY STANDARD DRINKS CONTAINING ALCOHOL DO YOU HAVE ON A TYPICAL DAY: PATIENT DOES NOT DRINK
HOW MANY STANDARD DRINKS CONTAINING ALCOHOL DO YOU HAVE ON A TYPICAL DAY: 0
HOW OFTEN DO YOU HAVE SIX OR MORE DRINKS ON ONE OCCASION: 1
HOW OFTEN DO YOU HAVE A DRINK CONTAINING ALCOHOL: NEVER

## 2025-07-18 ASSESSMENT — PATIENT HEALTH QUESTIONNAIRE - PHQ9
SUM OF ALL RESPONSES TO PHQ QUESTIONS 1-9: 1
SUM OF ALL RESPONSES TO PHQ QUESTIONS 1-9: 1
2. FEELING DOWN, DEPRESSED OR HOPELESS: NOT AT ALL
SUM OF ALL RESPONSES TO PHQ QUESTIONS 1-9: 1
1. LITTLE INTEREST OR PLEASURE IN DOING THINGS: SEVERAL DAYS
SUM OF ALL RESPONSES TO PHQ QUESTIONS 1-9: 1

## 2025-07-31 ENCOUNTER — OFFICE VISIT (OUTPATIENT)
Dept: INTERNAL MEDICINE CLINIC | Facility: CLINIC | Age: 75
End: 2025-07-31

## 2025-07-31 VITALS
HEART RATE: 57 BPM | SYSTOLIC BLOOD PRESSURE: 132 MMHG | OXYGEN SATURATION: 97 % | HEIGHT: 72 IN | TEMPERATURE: 98.4 F | WEIGHT: 246 LBS | BODY MASS INDEX: 33.32 KG/M2 | DIASTOLIC BLOOD PRESSURE: 76 MMHG

## 2025-07-31 DIAGNOSIS — I10 ESSENTIAL (PRIMARY) HYPERTENSION: ICD-10-CM

## 2025-07-31 DIAGNOSIS — F41.9 ANXIETY: ICD-10-CM

## 2025-07-31 DIAGNOSIS — N52.9 ERECTILE DYSFUNCTION, UNSPECIFIED ERECTILE DYSFUNCTION TYPE: ICD-10-CM

## 2025-07-31 DIAGNOSIS — Z23 NEED FOR PROPHYLACTIC VACCINATION AND INOCULATION AGAINST VARICELLA: ICD-10-CM

## 2025-07-31 DIAGNOSIS — E78.2 MIXED HYPERLIPIDEMIA: ICD-10-CM

## 2025-07-31 DIAGNOSIS — R39.12 BENIGN PROSTATIC HYPERPLASIA WITH WEAK URINARY STREAM: ICD-10-CM

## 2025-07-31 DIAGNOSIS — R73.01 ELEVATED FASTING GLUCOSE: ICD-10-CM

## 2025-07-31 DIAGNOSIS — E03.9 ACQUIRED HYPOTHYROIDISM: ICD-10-CM

## 2025-07-31 DIAGNOSIS — E78.2 MIXED DYSLIPIDEMIA: ICD-10-CM

## 2025-07-31 DIAGNOSIS — G62.9 NEUROPATHY: ICD-10-CM

## 2025-07-31 DIAGNOSIS — Z12.5 ENCOUNTER FOR PROSTATE CANCER SCREENING: ICD-10-CM

## 2025-07-31 DIAGNOSIS — Z00.00 MEDICARE ANNUAL WELLNESS VISIT, SUBSEQUENT: Primary | ICD-10-CM

## 2025-07-31 DIAGNOSIS — Z12.11 SCREENING FOR COLORECTAL CANCER: ICD-10-CM

## 2025-07-31 DIAGNOSIS — E55.9 VITAMIN D DEFICIENCY: ICD-10-CM

## 2025-07-31 DIAGNOSIS — I10 ESSENTIAL HYPERTENSION: ICD-10-CM

## 2025-07-31 DIAGNOSIS — Z12.12 SCREENING FOR COLORECTAL CANCER: ICD-10-CM

## 2025-07-31 DIAGNOSIS — Z29.11 NEED FOR PROPHYLACTIC VACCINATION AND INOCULATION AGAINST RESPIRATORY SYNCYTIAL VIRUS (RSV): ICD-10-CM

## 2025-07-31 DIAGNOSIS — G47.33 OBSTRUCTIVE SLEEP APNEA: ICD-10-CM

## 2025-07-31 DIAGNOSIS — N40.1 BENIGN PROSTATIC HYPERPLASIA WITH WEAK URINARY STREAM: ICD-10-CM

## 2025-07-31 DIAGNOSIS — I65.23 BILATERAL CAROTID ARTERY STENOSIS: ICD-10-CM

## 2025-07-31 RX ORDER — ATORVASTATIN CALCIUM 10 MG/1
10 TABLET, FILM COATED ORAL DAILY
Qty: 90 TABLET | Refills: 3 | Status: SHIPPED | OUTPATIENT
Start: 2025-07-31

## 2025-07-31 RX ORDER — LISINOPRIL 20 MG/1
20 TABLET ORAL DAILY
Qty: 90 TABLET | Refills: 3 | Status: SHIPPED | OUTPATIENT
Start: 2025-07-31

## 2025-07-31 RX ORDER — LEVOTHYROXINE SODIUM 50 UG/1
50 TABLET ORAL
Qty: 90 TABLET | Refills: 3 | Status: SHIPPED | OUTPATIENT
Start: 2025-07-31

## 2025-07-31 RX ORDER — GABAPENTIN 300 MG/1
300 CAPSULE ORAL DAILY
Qty: 90 CAPSULE | Refills: 1 | Status: SHIPPED | OUTPATIENT
Start: 2025-07-31 | End: 2026-01-27

## 2025-07-31 RX ORDER — ESCITALOPRAM OXALATE 5 MG/1
5 TABLET ORAL DAILY
Qty: 90 TABLET | Refills: 3 | Status: SHIPPED | OUTPATIENT
Start: 2025-07-31

## 2025-07-31 RX ORDER — RESPIRATORY SYNCYTIAL VIRUS VACCINE 120MCG/0.5
0.5 KIT INTRAMUSCULAR ONCE
Qty: 0.5 ML | Refills: 0 | Status: SHIPPED | OUTPATIENT
Start: 2025-07-31 | End: 2025-07-31

## 2025-07-31 RX ORDER — TADALAFIL 10 MG/1
10 TABLET ORAL PRN
Qty: 30 TABLET | Refills: 3 | Status: SHIPPED | OUTPATIENT
Start: 2025-07-31

## 2025-07-31 NOTE — PROGRESS NOTES
DTaP/Tdap/Td vaccine (3 - Td or Tdap) 09/28/2034    Pneumococcal 50+ years Vaccine  Completed    Hepatitis A vaccine  Aged Out    Hepatitis B vaccine  Aged Out    Hib vaccine  Aged Out    Polio vaccine  Aged Out    Meningococcal (ACWY) vaccine  Aged Out    Meningococcal B vaccine  Aged Out    Hepatitis C screen  Discontinued       Patient's complete Health Risk Assessment and screening values have been reviewed and are found in Flowsheets. The following problems were reviewed today and where indicated follow up appointments were made and/or referrals ordered.    Positive Risk Factor Screenings with Interventions:                Abnormal BMI (obese):  Body mass index is 33.36 kg/m². (!) Abnormal  Interventions:  See counseling/recommendations below    Obesity Counseling: Patient was asked about his current diet and exercise habits, and personalized advice was provided regarding recommended lifestyle changes. Patient's comorbid health conditions associated with elevated BMI were discussed, as well as the likely benefits of weight loss. Based upon patient's motivation to change his behavior, the following plan was agreed upon to work toward a weight loss goal of 10-20 pounds: lower carbohydrate diet and increase physical activity, as tolerated. Educational materials for weight loss were provided.  Patient will follow-up in 6 month(s) with PCP. Time spent counseling patient: 20 minutes                    Objective   /76 (BP Site: Left Upper Arm, Patient Position: Sitting, BP Cuff Size: Large Adult)   Pulse 57   Temp 98.4 °F (36.9 °C) (Temporal)   Ht 1.829 m (6')   Wt 111.6 kg (246 lb)   SpO2 97%   BMI 33.36 kg/m²   BP Readings from Last 3 Encounters:   07/31/25 132/76   05/30/25 134/62   01/23/25 132/76     Wt Readings from Last 3 Encounters:   07/31/25 111.6 kg (246 lb)   05/30/25 110.1 kg (242 lb 12.8 oz)   01/23/25 109.8 kg (242 lb)     Physical Exam  Vitals and nursing note reviewed. Exam conducted with

## 2025-07-31 NOTE — ASSESSMENT & PLAN NOTE
- Blood glucose level is currently 108, a slight increase from the previous reading of 104.  - Abdominal obesity can lead to impaired fasting sugars and prediabetes; however, he does not have diabetes.  - Lipids, thyroid function, liver function, blood counts, and platelets are all within normal ranges.  - Weight loss was recommended as a potential solution to his elevated blood glucose levels.   A1c periodically.  The patient is asked to make an attempt to improve diet and exercise patterns to aid in medical management of this problem.  Wt loss recommended.  Lab Results   Component Value Date/Time     07/18/2025 07:55 AM    K 4.6 07/18/2025 07:55 AM     07/18/2025 07:55 AM    CO2 25 07/18/2025 07:55 AM    BUN 20 07/18/2025 07:55 AM    CREATININE 1.14 07/18/2025 07:55 AM    GLUCOSE 108 07/18/2025 07:55 AM    CALCIUM 9.4 07/18/2025 07:55 AM    LABGLOM 67 07/18/2025 07:55 AM    LABGLOM >60 12/22/2023 08:57 AM

## 2025-07-31 NOTE — ASSESSMENT & PLAN NOTE
Lab Results   Component Value Date    TSH 2.760 07/18/2025     Treatment regimen is effective.   Continue Synthroid 50 mcg daily

## 2025-07-31 NOTE — ASSESSMENT & PLAN NOTE
- Alternative neuropathy treatments tried include laser treatment on the feet, vibration board therapy, oxygen therapy, and spinal stretching.  - Reported minimal benefit from these treatments.  - Continue to clinically monitor as appropriate.

## 2025-07-31 NOTE — ASSESSMENT & PLAN NOTE
EKG: normal EKG, normal sinus rhythm, normal sinus rhythm, rate 55.    Treatment regimen is effective.       Lab Results   Component Value Date/Time     07/18/2025 07:55 AM    K 4.6 07/18/2025 07:55 AM     07/18/2025 07:55 AM    CO2 25 07/18/2025 07:55 AM    BUN 20 07/18/2025 07:55 AM    CREATININE 1.14 07/18/2025 07:55 AM    GLUCOSE 108 07/18/2025 07:55 AM    CALCIUM 9.4 07/18/2025 07:55 AM    LABGLOM 67 07/18/2025 07:55 AM    LABGLOM >60 12/22/2023 08:57 AM      Hypertension Medications       ACE Inhibitors       lisinopril (PRINIVIL;ZESTRIL) 20 MG tablet Take 1 tablet by mouth daily

## 2025-07-31 NOTE — ASSESSMENT & PLAN NOTE
Well-controlled, continue current medications  Lab Results   Component Value Date    PSA 0.7 07/18/2025    PSA 0.7 07/23/2024    PSA 0.9 07/03/2023       The natural history of prostate cancer and ongoing controversy regarding screening and potential treatment outcomes of prostate cancer has been discussed with the patient. The meaning of a false positive PSA and a false negative PSA has been discussed. He indicates understanding of the limitations of this screening test and wishes to proceed with screening PSA testing.

## 2025-07-31 NOTE — ASSESSMENT & PLAN NOTE
Treatment regimen is effective.     Sleep apnea is well controlled with residual AHI of only 1.3 events per hour. I will keep his pressure settings the same, AutoSet 5 to 18 cm H2O. He uses a fullface mask. I will renew supply order to [facility], as needed or requested..

## 2025-08-13 ENCOUNTER — HOSPITAL ENCOUNTER (OUTPATIENT)
Dept: ULTRASOUND IMAGING | Age: 75
Discharge: HOME | End: 2025-08-15
Attending: INTERNAL MEDICINE
Payer: MEDICARE

## 2025-08-13 DIAGNOSIS — I65.23 BILATERAL CAROTID ARTERY STENOSIS: ICD-10-CM

## 2025-08-13 DIAGNOSIS — E78.2 MIXED DYSLIPIDEMIA: ICD-10-CM

## 2025-08-13 DIAGNOSIS — I10 ESSENTIAL HYPERTENSION: ICD-10-CM

## 2025-08-13 DIAGNOSIS — E78.2 MIXED HYPERLIPIDEMIA: ICD-10-CM

## 2025-08-13 DIAGNOSIS — F41.9 ANXIETY: ICD-10-CM

## 2025-08-13 DIAGNOSIS — E03.9 ACQUIRED HYPOTHYROIDISM: ICD-10-CM

## 2025-08-13 DIAGNOSIS — G62.9 NEUROPATHY: ICD-10-CM

## 2025-08-13 DIAGNOSIS — R73.01 ELEVATED FASTING GLUCOSE: ICD-10-CM

## 2025-08-13 DIAGNOSIS — N52.9 ERECTILE DYSFUNCTION, UNSPECIFIED ERECTILE DYSFUNCTION TYPE: ICD-10-CM

## 2025-08-13 PROCEDURE — 76706 US ABDL AORTA SCREEN AAA: CPT | Performed by: RADIOLOGY

## 2025-08-13 PROCEDURE — 93880 EXTRACRANIAL BILAT STUDY: CPT | Performed by: RADIOLOGY

## 2025-08-13 PROCEDURE — 76706 US ABDL AORTA SCREEN AAA: CPT

## 2025-08-13 PROCEDURE — 93880 EXTRACRANIAL BILAT STUDY: CPT
